# Patient Record
Sex: MALE | Race: OTHER | HISPANIC OR LATINO | Employment: FULL TIME | URBAN - METROPOLITAN AREA
[De-identification: names, ages, dates, MRNs, and addresses within clinical notes are randomized per-mention and may not be internally consistent; named-entity substitution may affect disease eponyms.]

---

## 2023-12-02 ENCOUNTER — ANESTHESIA EVENT (OUTPATIENT)
Dept: SURGERY | Facility: HOSPITAL | Age: 28
DRG: 399 | End: 2023-12-02

## 2023-12-02 ENCOUNTER — HOSPITAL ENCOUNTER (INPATIENT)
Facility: HOSPITAL | Age: 28
LOS: 2 days | Discharge: HOME | DRG: 399 | End: 2023-12-05
Attending: EMERGENCY MEDICINE | Admitting: SURGERY

## 2023-12-02 DIAGNOSIS — K35.30 ACUTE APPENDICITIS WITH LOCALIZED PERITONITIS, WITHOUT PERFORATION, ABSCESS, OR GANGRENE: ICD-10-CM

## 2023-12-02 DIAGNOSIS — K35.201 ACUTE APPENDICITIS WITH PERFORATION AND GENERALIZED PERITONITIS, WITHOUT ABSCESS, UNSPECIFIED WHETHER GANGRENE PRESENT: Primary | ICD-10-CM

## 2023-12-02 DIAGNOSIS — K35.33 ACUTE APPENDICITIS WITH APPENDICEAL ABSCESS: ICD-10-CM

## 2023-12-02 LAB
ALBUMIN SERPL BCP-MCNC: 4.9 G/DL (ref 3.5–5.2)
ALP SERPL-CCNC: 71 U/L (ref 55–135)
ALT SERPL W/O P-5'-P-CCNC: 107 U/L (ref 10–44)
ANION GAP SERPL CALC-SCNC: 15 MMOL/L (ref 8–16)
AST SERPL-CCNC: 35 U/L (ref 10–40)
BACTERIA #/AREA URNS HPF: NORMAL /HPF
BASOPHILS # BLD AUTO: 0.03 K/UL (ref 0–0.2)
BASOPHILS NFR BLD: 0.2 % (ref 0–1.9)
BILIRUB SERPL-MCNC: 0.8 MG/DL (ref 0.1–1)
BILIRUB UR QL STRIP: NEGATIVE
BUN SERPL-MCNC: 10 MG/DL (ref 6–20)
CALCIUM SERPL-MCNC: 9.8 MG/DL (ref 8.7–10.5)
CHLORIDE SERPL-SCNC: 103 MMOL/L (ref 95–110)
CLARITY UR: CLEAR
CO2 SERPL-SCNC: 20 MMOL/L (ref 23–29)
COLOR UR: YELLOW
CREAT SERPL-MCNC: 1.1 MG/DL (ref 0.5–1.4)
DIFFERENTIAL METHOD: ABNORMAL
EOSINOPHIL # BLD AUTO: 0 K/UL (ref 0–0.5)
EOSINOPHIL NFR BLD: 0 % (ref 0–8)
ERYTHROCYTE [DISTWIDTH] IN BLOOD BY AUTOMATED COUNT: 11.9 % (ref 11.5–14.5)
EST. GFR  (NO RACE VARIABLE): >60 ML/MIN/1.73 M^2
GLUCOSE SERPL-MCNC: 169 MG/DL (ref 70–110)
GLUCOSE UR QL STRIP: NEGATIVE
HCT VFR BLD AUTO: 46.3 % (ref 40–54)
HCV AB SERPL QL IA: NEGATIVE
HEP C VIRUS HOLD SPECIMEN: NORMAL
HGB BLD-MCNC: 16.2 G/DL (ref 14–18)
HGB UR QL STRIP: NEGATIVE
HIV 1+2 AB+HIV1 P24 AG SERPL QL IA: NEGATIVE
HYALINE CASTS #/AREA URNS LPF: 1 /LPF
IMM GRANULOCYTES # BLD AUTO: 0.06 K/UL (ref 0–0.04)
IMM GRANULOCYTES NFR BLD AUTO: 0.4 % (ref 0–0.5)
KETONES UR QL STRIP: ABNORMAL
LEUKOCYTE ESTERASE UR QL STRIP: NEGATIVE
LIPASE SERPL-CCNC: 39 U/L (ref 4–60)
LYMPHOCYTES # BLD AUTO: 0.5 K/UL (ref 1–4.8)
LYMPHOCYTES NFR BLD: 3.1 % (ref 18–48)
MCH RBC QN AUTO: 30.6 PG (ref 27–31)
MCHC RBC AUTO-ENTMCNC: 35 G/DL (ref 32–36)
MCV RBC AUTO: 88 FL (ref 82–98)
MICROSCOPIC COMMENT: NORMAL
MONOCYTES # BLD AUTO: 0.4 K/UL (ref 0.3–1)
MONOCYTES NFR BLD: 2.8 % (ref 4–15)
NEUTROPHILS # BLD AUTO: 14.7 K/UL (ref 1.8–7.7)
NEUTROPHILS NFR BLD: 93.5 % (ref 38–73)
NITRITE UR QL STRIP: NEGATIVE
NRBC BLD-RTO: 0 /100 WBC
PH UR STRIP: 7 [PH] (ref 5–8)
PLATELET # BLD AUTO: 199 K/UL (ref 150–450)
PMV BLD AUTO: 10.8 FL (ref 9.2–12.9)
POTASSIUM SERPL-SCNC: 4 MMOL/L (ref 3.5–5.1)
PROT SERPL-MCNC: 9.1 G/DL (ref 6–8.4)
PROT UR QL STRIP: ABNORMAL
RBC # BLD AUTO: 5.29 M/UL (ref 4.6–6.2)
RBC #/AREA URNS HPF: 0 /HPF (ref 0–4)
SODIUM SERPL-SCNC: 138 MMOL/L (ref 136–145)
SP GR UR STRIP: >1.03 (ref 1–1.03)
SQUAMOUS #/AREA URNS HPF: 1 /HPF
URN SPEC COLLECT METH UR: ABNORMAL
UROBILINOGEN UR STRIP-ACNC: NEGATIVE EU/DL
WBC # BLD AUTO: 15.69 K/UL (ref 3.9–12.7)
WBC #/AREA URNS HPF: 0 /HPF (ref 0–5)

## 2023-12-02 PROCEDURE — 25000003 PHARM REV CODE 250: Performed by: SURGERY

## 2023-12-02 PROCEDURE — 25000003 PHARM REV CODE 250: Performed by: REGISTERED NURSE

## 2023-12-02 PROCEDURE — G0378 HOSPITAL OBSERVATION PER HR: HCPCS

## 2023-12-02 PROCEDURE — 96361 HYDRATE IV INFUSION ADD-ON: CPT

## 2023-12-02 PROCEDURE — 86803 HEPATITIS C AB TEST: CPT | Performed by: EMERGENCY MEDICINE

## 2023-12-02 PROCEDURE — 81000 URINALYSIS NONAUTO W/SCOPE: CPT | Performed by: REGISTERED NURSE

## 2023-12-02 PROCEDURE — 63600175 PHARM REV CODE 636 W HCPCS: Performed by: SURGERY

## 2023-12-02 PROCEDURE — 96375 TX/PRO/DX INJ NEW DRUG ADDON: CPT

## 2023-12-02 PROCEDURE — 99223 PR INITIAL HOSPITAL CARE,LEVL III: ICD-10-PCS | Mod: ,,, | Performed by: SURGERY

## 2023-12-02 PROCEDURE — 83690 ASSAY OF LIPASE: CPT | Performed by: REGISTERED NURSE

## 2023-12-02 PROCEDURE — 80053 COMPREHEN METABOLIC PANEL: CPT | Performed by: REGISTERED NURSE

## 2023-12-02 PROCEDURE — 25500020 PHARM REV CODE 255: Performed by: EMERGENCY MEDICINE

## 2023-12-02 PROCEDURE — 85025 COMPLETE CBC W/AUTO DIFF WBC: CPT | Performed by: REGISTERED NURSE

## 2023-12-02 PROCEDURE — 87389 HIV-1 AG W/HIV-1&-2 AB AG IA: CPT | Performed by: EMERGENCY MEDICINE

## 2023-12-02 PROCEDURE — 99285 EMERGENCY DEPT VISIT HI MDM: CPT | Mod: 25

## 2023-12-02 PROCEDURE — 63600175 PHARM REV CODE 636 W HCPCS: Performed by: REGISTERED NURSE

## 2023-12-02 PROCEDURE — 99223 1ST HOSP IP/OBS HIGH 75: CPT | Mod: ,,, | Performed by: SURGERY

## 2023-12-02 PROCEDURE — 63600175 PHARM REV CODE 636 W HCPCS: Performed by: EMERGENCY MEDICINE

## 2023-12-02 PROCEDURE — 25000003 PHARM REV CODE 250: Performed by: EMERGENCY MEDICINE

## 2023-12-02 PROCEDURE — 96366 THER/PROPH/DIAG IV INF ADDON: CPT

## 2023-12-02 RX ORDER — HYDROCODONE BITARTRATE AND ACETAMINOPHEN 5; 325 MG/1; MG/1
1 TABLET ORAL EVERY 4 HOURS PRN
Status: DISCONTINUED | OUTPATIENT
Start: 2023-12-02 | End: 2023-12-05 | Stop reason: HOSPADM

## 2023-12-02 RX ORDER — HYDROMORPHONE HYDROCHLORIDE 2 MG/ML
1 INJECTION, SOLUTION INTRAMUSCULAR; INTRAVENOUS; SUBCUTANEOUS
Status: DISCONTINUED | OUTPATIENT
Start: 2023-12-02 | End: 2023-12-05 | Stop reason: HOSPADM

## 2023-12-02 RX ORDER — SODIUM CHLORIDE 9 MG/ML
1000 INJECTION, SOLUTION INTRAVENOUS CONTINUOUS
Status: DISCONTINUED | OUTPATIENT
Start: 2023-12-02 | End: 2023-12-02

## 2023-12-02 RX ORDER — MORPHINE SULFATE 4 MG/ML
4 INJECTION, SOLUTION INTRAMUSCULAR; INTRAVENOUS
Status: COMPLETED | OUTPATIENT
Start: 2023-12-02 | End: 2023-12-02

## 2023-12-02 RX ORDER — ONDANSETRON 4 MG/1
4 TABLET, ORALLY DISINTEGRATING ORAL EVERY 6 HOURS PRN
Status: DISCONTINUED | OUTPATIENT
Start: 2023-12-02 | End: 2023-12-05 | Stop reason: HOSPADM

## 2023-12-02 RX ORDER — ONDANSETRON 2 MG/ML
4 INJECTION INTRAMUSCULAR; INTRAVENOUS
Status: COMPLETED | OUTPATIENT
Start: 2023-12-02 | End: 2023-12-02

## 2023-12-02 RX ORDER — HYDROCODONE BITARTRATE AND ACETAMINOPHEN 7.5; 325 MG/1; MG/1
1 TABLET ORAL EVERY 6 HOURS PRN
Status: DISCONTINUED | OUTPATIENT
Start: 2023-12-02 | End: 2023-12-05 | Stop reason: HOSPADM

## 2023-12-02 RX ORDER — SODIUM CHLORIDE, SODIUM LACTATE, POTASSIUM CHLORIDE, CALCIUM CHLORIDE 600; 310; 30; 20 MG/100ML; MG/100ML; MG/100ML; MG/100ML
INJECTION, SOLUTION INTRAVENOUS CONTINUOUS
Status: DISCONTINUED | OUTPATIENT
Start: 2023-12-02 | End: 2023-12-03

## 2023-12-02 RX ADMIN — HYDROCODONE BITARTRATE AND ACETAMINOPHEN 1 TABLET: 7.5; 325 TABLET ORAL at 10:12

## 2023-12-02 RX ADMIN — ONDANSETRON 4 MG: 2 INJECTION INTRAMUSCULAR; INTRAVENOUS at 05:12

## 2023-12-02 RX ADMIN — SODIUM CHLORIDE 1000 ML: 9 INJECTION, SOLUTION INTRAVENOUS at 05:12

## 2023-12-02 RX ADMIN — SODIUM CHLORIDE, POTASSIUM CHLORIDE, SODIUM LACTATE AND CALCIUM CHLORIDE: 600; 310; 30; 20 INJECTION, SOLUTION INTRAVENOUS at 07:12

## 2023-12-02 RX ADMIN — SODIUM CHLORIDE 1000 ML: 9 INJECTION, SOLUTION INTRAVENOUS at 06:12

## 2023-12-02 RX ADMIN — PIPERACILLIN SODIUM AND TAZOBACTAM SODIUM 4.5 G: 4; .5 INJECTION, POWDER, FOR SOLUTION INTRAVENOUS at 06:12

## 2023-12-02 RX ADMIN — IOHEXOL 100 ML: 350 INJECTION, SOLUTION INTRAVENOUS at 05:12

## 2023-12-02 RX ADMIN — MORPHINE SULFATE 4 MG: 4 INJECTION INTRAVENOUS at 05:12

## 2023-12-02 NOTE — ED PROVIDER NOTES
Encounter Date: 12/2/2023       History     Chief Complaint   Patient presents with    Abdominal Pain     Abdominal pain started today at 3 am, + vomiting, no diarrhea, states all of abd hurts, also testicles hurt. No known fever.      Patient is a 28-year-old male who presents today with complaints of right lower quadrant abdominal pain that started 14 hours ago.  It woke him up from his sleep.  Three vomiting episodes.  One episode of diarrhea here in the emergency department.  Denies any fever.  Pain radiates towards his right testicle.  He does have pain is bilateral lower back.  No history of kidney stones.  No history of appendectomy.  Associated symptoms include dysuria.  Denies hematuria.  He took over-the-counter medications without relief.  He is requesting some pain medication      Review of patient's allergies indicates:  No Known Allergies  History reviewed. No pertinent past medical history.  History reviewed. No pertinent surgical history.  History reviewed. No pertinent family history.  Social History     Tobacco Use    Smoking status: Never    Smokeless tobacco: Never   Substance Use Topics    Alcohol use: Yes     Review of Systems   Gastrointestinal:  Positive for abdominal pain (RLQ), diarrhea, nausea and vomiting.   Genitourinary:  Positive for dysuria and testicular pain. Negative for scrotal swelling.   All other systems reviewed and are negative.      Physical Exam     Initial Vitals [12/02/23 1508]   BP Pulse Resp Temp SpO2   138/74 94 20 98.1 °F (36.7 °C) 99 %      MAP       --         Physical Exam    Nursing note and vitals reviewed.  Constitutional: He appears well-developed and well-nourished. No distress.   HENT:   Head: Normocephalic and atraumatic.   Mouth/Throat: Oropharynx is clear and moist.   Eyes: Conjunctivae and EOM are normal. Pupils are equal, round, and reactive to light.   Neck: Neck supple. No tracheal deviation present.   Cardiovascular:  Normal rate, regular rhythm,  normal heart sounds and intact distal pulses.           Pulmonary/Chest: Breath sounds normal. No respiratory distress.   Abdominal: Abdomen is soft. He exhibits no distension. There is no abdominal tenderness. There is no rebound and no guarding.   Genitourinary:    Genitourinary Comments: Mild right testicular tenderness to palpation without any edema.  No erythema.  No crepitus or skin change     Musculoskeletal:         General: No tenderness or edema. Normal range of motion.      Cervical back: Neck supple.     Neurological: He is alert and oriented to person, place, and time. GCS score is 15. GCS eye subscore is 4. GCS verbal subscore is 5. GCS motor subscore is 6.   No focal deficits   Skin: Skin is warm. No rash noted. No erythema.   Psychiatric: He has a normal mood and affect. His behavior is normal.         ED Course   Critical Care    Date/Time: 12/2/2023 7:02 PM    Performed by: Ryan Rodriguez MD  Authorized by: Ryan Rodriguez MD  Direct patient critical care time: 15 minutes  Additional history critical care time: 5 minutes  Ordering / reviewing critical care time: 5 minutes  Documentation critical care time: 5 minutes  Consulting other physicians critical care time: 5 minutes  Total critical care time (exclusive of procedural time) : 35 minutes  Critical care time was exclusive of separately billable procedures and treating other patients and teaching time.  Critical care was necessary to treat or prevent imminent or life-threatening deterioration of the following conditions: Acute appendicitis with perforation.  Critical care was time spent personally by me on the following activities: blood draw for specimens, development of treatment plan with patient or surrogate, discussions with consultants, interpretation of cardiac output measurements, evaluation of patient's response to treatment, examination of patient, obtaining history from patient or surrogate, ordering and performing treatments and  interventions, ordering and review of laboratory studies, ordering and review of radiographic studies, pulse oximetry, re-evaluation of patient's condition and review of old charts.        Labs Reviewed   CBC W/ AUTO DIFFERENTIAL - Abnormal; Notable for the following components:       Result Value    WBC 15.69 (*)     Gran # (ANC) 14.7 (*)     Immature Grans (Abs) 0.06 (*)     Lymph # 0.5 (*)     Gran % 93.5 (*)     Lymph % 3.1 (*)     Mono % 2.8 (*)     All other components within normal limits    Narrative:     Release to patient->Immediate   COMPREHENSIVE METABOLIC PANEL - Abnormal; Notable for the following components:    CO2 20 (*)     Glucose 169 (*)     Total Protein 9.1 (*)      (*)     All other components within normal limits    Narrative:     Release to patient->Immediate   URINALYSIS, REFLEX TO URINE CULTURE - Abnormal; Notable for the following components:    Specific Gravity, UA >1.030 (*)     Protein, UA 1+ (*)     Ketones, UA 1+ (*)     All other components within normal limits    Narrative:     Specimen Source->Urine   HIV 1 / 2 ANTIBODY    Narrative:     Release to patient->Immediate   HEPATITIS C ANTIBODY    Narrative:     Release to patient->Immediate   HEP C VIRUS HOLD SPECIMEN    Narrative:     Release to patient->Immediate   LIPASE    Narrative:     Release to patient->Immediate   URINALYSIS MICROSCOPIC    Narrative:     Specimen Source->Urine          Imaging Results               CT Abdomen Pelvis With IV Contrast NO Oral Contrast (Final result)  Result time 12/02/23 18:09:50      Final result by Rohan Johnson MD (12/02/23 18:09:50)                   Impression:      Acute appendicitis with findings concerning for early appendiceal perforation as above.  Surgical consultation is are gently advised.    This report was flagged in Epic as abnormal.    All CT scans at this facility are performed  using dose modulation techniques as appropriate to performed exam including the  following:  automated exposure control; adjustment of mA and/or kV according to the patients size (this includes techniques or standardized protocols for targeted exams where dose is matched to indication/reason for exam: i.e. extremities or head);  iterative reconstruction technique.      Electronically signed by: Rohan Johnson  Date:    12/02/2023  Time:    18:09               Narrative:    EXAMINATION:  CT ABDOMEN PELVIS WITH IV CONTRAST    CLINICAL HISTORY:  Abdominal pain, acute, nonlocalized;    TECHNIQUE:  Low dose axial images, sagittal and coronal reformations were obtained from the lung bases to the pubic symphysis.  Contrast was not administered.    COMPARISON:  None    FINDINGS:  Heart: Normal in size. No pericardial effusion.    Lung Bases: Well aerated, without consolidation or pleural fluid.    Liver: Diffuse hepatic steatosis.  Mild hepatomegaly.    Gallbladder: No calcified gallstones.    Bile Ducts: No evidence of dilated ducts.    Pancreas: No mass or peripancreatic fat stranding.    Spleen: Unremarkable.    Adrenals: Unremarkable.    Kidneys/ Ureters: No hydronephrosis.  No obstructive uropathy.  No nonobstructive nephrolithiasis.    Bladder: No evidence of wall thickening.    Reproductive organs: Unremarkable.    GI Tract/Mesentery: Appendicitis with findings concerning for early perforation given non enhancement of a portion of the appendiceal tip such as on series 602, image 100.  Urgent surgical consultation is advised.    Peritoneal Space: Small volume right lower quadrant pelvic free fluid.  No free air.    Retroperitoneum: No significant adenopathy.    Abdominal wall: Unremarkable.    Vasculature: No significant atherosclerosis or aneurysm.    Bones: No acute fracture.                                       Medications   piperacillin-tazobactam (ZOSYN) 4.5 g in dextrose 5 % in water (D5W) 100 mL IVPB (MB+) (4.5 g Intravenous New Bag 12/2/23 0762)   HYDROmorphone (PF) injection 1 mg (has no  administration in time range)   HYDROcodone-acetaminophen 7.5-325 mg per tablet 1 tablet (has no administration in time range)   HYDROcodone-acetaminophen 5-325 mg per tablet 1 tablet (has no administration in time range)   ondansetron disintegrating tablet 4 mg (has no administration in time range)   lactated ringers infusion (has no administration in time range)   ondansetron injection 4 mg (4 mg Intravenous Given 12/2/23 1711)   sodium chloride 0.9% bolus 1,000 mL 1,000 mL (0 mLs Intravenous Stopped 12/2/23 1806)   morphine injection 4 mg (4 mg Intravenous Given 12/2/23 1712)   iohexoL (OMNIPAQUE 350) injection 100 mL (100 mLs Intravenous Given 12/2/23 1738)     Medical Decision Making  Patient reporting right lower quadrant pain that radiated to the bilateral lower back and right testicle.  Tender on palpation.  Differential diagnosis includes appendicitis, nephrolithiasis, testicular torsion, urinary tract infection.  Workup revealed appendicitis with acute perforation.  Patient was treated with pain medicine, nausea medicine, IV fluids, Zosyn.  General surgery consulted and evaluated patient at bedside.  Patient admitted to general surgery Dr. Cornejo    Amount and/or Complexity of Data Reviewed  External Data Reviewed: notes.  Labs: ordered. Decision-making details documented in ED Course.  Radiology: ordered. Decision-making details documented in ED Course.    Risk  Prescription drug management.  Parenteral controlled substances.  Decision regarding hospitalization.  Emergency major surgery.                                      Clinical Impression:  Final diagnoses:  [K35.201] Acute appendicitis with perforation and generalized peritonitis, without abscess, unspecified whether gangrene present (Primary)          ED Disposition Condition    Observation                 Ryan Rodriguez MD  12/02/23 1008

## 2023-12-02 NOTE — FIRST PROVIDER EVALUATION
Medical screening examination initiated.  I have conducted a focused provider triage encounter, findings are as follows:    Brief history of present illness:  Abdominal pain since 3:00 a.m., vomiting x3    Vitals:    12/02/23 1508   BP: 138/74   BP Location: Right arm   Patient Position: Sitting   Pulse: 94   Resp: 20   Temp: 98.1 °F (36.7 °C)   TempSrc: Oral   SpO2: 99%   Weight: 114.8 kg (252 lb 15.7 oz)       Pertinent physical exam:  Abdominal tenderness with guarding, vital signs stable    Brief workup plan:  Workup    Preliminary workup initiated; this workup will be continued and followed by the physician or advanced practice provider that is assigned to the patient when roomed.

## 2023-12-03 ENCOUNTER — ANESTHESIA (OUTPATIENT)
Dept: SURGERY | Facility: HOSPITAL | Age: 28
DRG: 399 | End: 2023-12-03

## 2023-12-03 LAB
ANION GAP SERPL CALC-SCNC: 11 MMOL/L (ref 8–16)
BASOPHILS # BLD AUTO: 0.02 K/UL (ref 0–0.2)
BASOPHILS NFR BLD: 0.1 % (ref 0–1.9)
BUN SERPL-MCNC: 12 MG/DL (ref 6–20)
CALCIUM SERPL-MCNC: 9.1 MG/DL (ref 8.7–10.5)
CHLORIDE SERPL-SCNC: 104 MMOL/L (ref 95–110)
CO2 SERPL-SCNC: 23 MMOL/L (ref 23–29)
CREAT SERPL-MCNC: 1.1 MG/DL (ref 0.5–1.4)
DIFFERENTIAL METHOD: ABNORMAL
EOSINOPHIL # BLD AUTO: 0 K/UL (ref 0–0.5)
EOSINOPHIL NFR BLD: 0 % (ref 0–8)
ERYTHROCYTE [DISTWIDTH] IN BLOOD BY AUTOMATED COUNT: 12 % (ref 11.5–14.5)
EST. GFR  (NO RACE VARIABLE): >60 ML/MIN/1.73 M^2
GLUCOSE SERPL-MCNC: 137 MG/DL (ref 70–110)
HCT VFR BLD AUTO: 42.9 % (ref 40–54)
HGB BLD-MCNC: 14.5 G/DL (ref 14–18)
IMM GRANULOCYTES # BLD AUTO: 0.05 K/UL (ref 0–0.04)
IMM GRANULOCYTES NFR BLD AUTO: 0.3 % (ref 0–0.5)
LYMPHOCYTES # BLD AUTO: 1 K/UL (ref 1–4.8)
LYMPHOCYTES NFR BLD: 6.2 % (ref 18–48)
MCH RBC QN AUTO: 30 PG (ref 27–31)
MCHC RBC AUTO-ENTMCNC: 33.8 G/DL (ref 32–36)
MCV RBC AUTO: 89 FL (ref 82–98)
MONOCYTES # BLD AUTO: 0.4 K/UL (ref 0.3–1)
MONOCYTES NFR BLD: 2.8 % (ref 4–15)
NEUTROPHILS # BLD AUTO: 14.2 K/UL (ref 1.8–7.7)
NEUTROPHILS NFR BLD: 90.6 % (ref 38–73)
NRBC BLD-RTO: 0 /100 WBC
PLATELET # BLD AUTO: 187 K/UL (ref 150–450)
PMV BLD AUTO: 11.1 FL (ref 9.2–12.9)
POCT GLUCOSE: 165 MG/DL (ref 70–110)
POTASSIUM SERPL-SCNC: 4.1 MMOL/L (ref 3.5–5.1)
RBC # BLD AUTO: 4.83 M/UL (ref 4.6–6.2)
SODIUM SERPL-SCNC: 138 MMOL/L (ref 136–145)
WBC # BLD AUTO: 15.62 K/UL (ref 3.9–12.7)

## 2023-12-03 PROCEDURE — 63600175 PHARM REV CODE 636 W HCPCS: Performed by: SURGERY

## 2023-12-03 PROCEDURE — 44970 PR LAP,APPENDECTOMY: ICD-10-PCS | Mod: ,,, | Performed by: SURGERY

## 2023-12-03 PROCEDURE — 36415 COLL VENOUS BLD VENIPUNCTURE: CPT | Performed by: SURGERY

## 2023-12-03 PROCEDURE — 96361 HYDRATE IV INFUSION ADD-ON: CPT

## 2023-12-03 PROCEDURE — 88304 TISSUE EXAM BY PATHOLOGIST: CPT | Performed by: PATHOLOGY

## 2023-12-03 PROCEDURE — 63600175 PHARM REV CODE 636 W HCPCS: Performed by: NURSE ANESTHETIST, CERTIFIED REGISTERED

## 2023-12-03 PROCEDURE — 96375 TX/PRO/DX INJ NEW DRUG ADDON: CPT

## 2023-12-03 PROCEDURE — 37000008 HC ANESTHESIA 1ST 15 MINUTES: Performed by: SURGERY

## 2023-12-03 PROCEDURE — C1729 CATH, DRAINAGE: HCPCS | Performed by: SURGERY

## 2023-12-03 PROCEDURE — 80048 BASIC METABOLIC PNL TOTAL CA: CPT | Performed by: SURGERY

## 2023-12-03 PROCEDURE — 63600175 PHARM REV CODE 636 W HCPCS: Performed by: ANESTHESIOLOGY

## 2023-12-03 PROCEDURE — 71000039 HC RECOVERY, EACH ADD'L HOUR: Performed by: SURGERY

## 2023-12-03 PROCEDURE — 36000709 HC OR TIME LEV III EA ADD 15 MIN: Performed by: SURGERY

## 2023-12-03 PROCEDURE — 27201423 OPTIME MED/SURG SUP & DEVICES STERILE SUPPLY: Performed by: SURGERY

## 2023-12-03 PROCEDURE — 88304 TISSUE EXAM BY PATHOLOGIST: CPT | Mod: 26,,, | Performed by: PATHOLOGY

## 2023-12-03 PROCEDURE — 44970 LAPAROSCOPY APPENDECTOMY: CPT | Mod: ,,, | Performed by: SURGERY

## 2023-12-03 PROCEDURE — 37000009 HC ANESTHESIA EA ADD 15 MINS: Performed by: SURGERY

## 2023-12-03 PROCEDURE — 85025 COMPLETE CBC W/AUTO DIFF WBC: CPT | Performed by: SURGERY

## 2023-12-03 PROCEDURE — 36000708 HC OR TIME LEV III 1ST 15 MIN: Performed by: SURGERY

## 2023-12-03 PROCEDURE — 88304 PR  SURG PATH,LEVEL III: ICD-10-PCS | Mod: 26,,, | Performed by: PATHOLOGY

## 2023-12-03 PROCEDURE — 96376 TX/PRO/DX INJ SAME DRUG ADON: CPT

## 2023-12-03 PROCEDURE — 99233 PR SUBSEQUENT HOSPITAL CARE,LEVL III: ICD-10-PCS | Mod: 57,,, | Performed by: SURGERY

## 2023-12-03 PROCEDURE — 25000003 PHARM REV CODE 250: Performed by: SURGERY

## 2023-12-03 PROCEDURE — 71000033 HC RECOVERY, INTIAL HOUR: Performed by: SURGERY

## 2023-12-03 PROCEDURE — 25000003 PHARM REV CODE 250: Performed by: NURSE ANESTHETIST, CERTIFIED REGISTERED

## 2023-12-03 PROCEDURE — 99233 SBSQ HOSP IP/OBS HIGH 50: CPT | Mod: 57,,, | Performed by: SURGERY

## 2023-12-03 PROCEDURE — 11000001 HC ACUTE MED/SURG PRIVATE ROOM

## 2023-12-03 RX ORDER — ROCURONIUM BROMIDE 10 MG/ML
INJECTION, SOLUTION INTRAVENOUS
Status: DISCONTINUED | OUTPATIENT
Start: 2023-12-03 | End: 2023-12-03

## 2023-12-03 RX ORDER — CHLORHEXIDINE GLUCONATE ORAL RINSE 1.2 MG/ML
10 SOLUTION DENTAL 2 TIMES DAILY
Status: DISCONTINUED | OUTPATIENT
Start: 2023-12-03 | End: 2023-12-05 | Stop reason: HOSPADM

## 2023-12-03 RX ORDER — ONDANSETRON 2 MG/ML
INJECTION INTRAMUSCULAR; INTRAVENOUS
Status: DISCONTINUED | OUTPATIENT
Start: 2023-12-03 | End: 2023-12-03

## 2023-12-03 RX ORDER — SODIUM CHLORIDE, SODIUM LACTATE, POTASSIUM CHLORIDE, CALCIUM CHLORIDE 600; 310; 30; 20 MG/100ML; MG/100ML; MG/100ML; MG/100ML
INJECTION, SOLUTION INTRAVENOUS CONTINUOUS PRN
Status: DISCONTINUED | OUTPATIENT
Start: 2023-12-03 | End: 2023-12-03

## 2023-12-03 RX ORDER — SODIUM CHLORIDE 0.9 % (FLUSH) 0.9 %
10 SYRINGE (ML) INJECTION
Status: DISCONTINUED | OUTPATIENT
Start: 2023-12-03 | End: 2023-12-05 | Stop reason: HOSPADM

## 2023-12-03 RX ORDER — CEFAZOLIN SODIUM 1 G/3ML
INJECTION, POWDER, FOR SOLUTION INTRAMUSCULAR; INTRAVENOUS
Status: DISCONTINUED | OUTPATIENT
Start: 2023-12-03 | End: 2023-12-03

## 2023-12-03 RX ORDER — SODIUM CHLORIDE, SODIUM LACTATE, POTASSIUM CHLORIDE, CALCIUM CHLORIDE 600; 310; 30; 20 MG/100ML; MG/100ML; MG/100ML; MG/100ML
INJECTION, SOLUTION INTRAVENOUS CONTINUOUS
Status: DISCONTINUED | OUTPATIENT
Start: 2023-12-03 | End: 2023-12-05 | Stop reason: HOSPADM

## 2023-12-03 RX ORDER — HYDROMORPHONE HYDROCHLORIDE 2 MG/ML
0.2 INJECTION, SOLUTION INTRAMUSCULAR; INTRAVENOUS; SUBCUTANEOUS EVERY 5 MIN PRN
Status: COMPLETED | OUTPATIENT
Start: 2023-12-03 | End: 2023-12-03

## 2023-12-03 RX ORDER — TALC
6 POWDER (GRAM) TOPICAL NIGHTLY PRN
Status: DISCONTINUED | OUTPATIENT
Start: 2023-12-03 | End: 2023-12-05 | Stop reason: HOSPADM

## 2023-12-03 RX ORDER — BUPIVACAINE HYDROCHLORIDE 2.5 MG/ML
INJECTION, SOLUTION EPIDURAL; INFILTRATION; INTRACAUDAL
Status: DISCONTINUED | OUTPATIENT
Start: 2023-12-03 | End: 2023-12-03 | Stop reason: HOSPADM

## 2023-12-03 RX ORDER — PROPOFOL 10 MG/ML
VIAL (ML) INTRAVENOUS
Status: DISCONTINUED | OUTPATIENT
Start: 2023-12-03 | End: 2023-12-03

## 2023-12-03 RX ORDER — ONDANSETRON 2 MG/ML
4 INJECTION INTRAMUSCULAR; INTRAVENOUS DAILY PRN
Status: DISCONTINUED | OUTPATIENT
Start: 2023-12-03 | End: 2023-12-03

## 2023-12-03 RX ORDER — NEOSTIGMINE METHYLSULFATE 1 MG/ML
INJECTION, SOLUTION INTRAVENOUS
Status: DISCONTINUED | OUTPATIENT
Start: 2023-12-03 | End: 2023-12-03

## 2023-12-03 RX ORDER — ACETAMINOPHEN 325 MG/1
650 TABLET ORAL EVERY 8 HOURS PRN
Status: DISCONTINUED | OUTPATIENT
Start: 2023-12-03 | End: 2023-12-05 | Stop reason: HOSPADM

## 2023-12-03 RX ORDER — KETOROLAC TROMETHAMINE 30 MG/ML
15 INJECTION, SOLUTION INTRAMUSCULAR; INTRAVENOUS EVERY 8 HOURS PRN
Status: DISCONTINUED | OUTPATIENT
Start: 2023-12-03 | End: 2023-12-03

## 2023-12-03 RX ORDER — LIDOCAINE HYDROCHLORIDE 10 MG/ML
INJECTION, SOLUTION EPIDURAL; INFILTRATION; INTRACAUDAL; PERINEURAL
Status: DISCONTINUED | OUTPATIENT
Start: 2023-12-03 | End: 2023-12-03

## 2023-12-03 RX ORDER — OXYCODONE AND ACETAMINOPHEN 5; 325 MG/1; MG/1
1 TABLET ORAL
Status: DISCONTINUED | OUTPATIENT
Start: 2023-12-03 | End: 2023-12-03

## 2023-12-03 RX ORDER — MIDAZOLAM HYDROCHLORIDE 1 MG/ML
INJECTION INTRAMUSCULAR; INTRAVENOUS
Status: DISCONTINUED | OUTPATIENT
Start: 2023-12-03 | End: 2023-12-03

## 2023-12-03 RX ORDER — ENOXAPARIN SODIUM 100 MG/ML
40 INJECTION SUBCUTANEOUS EVERY 24 HOURS
Status: DISCONTINUED | OUTPATIENT
Start: 2023-12-04 | End: 2023-12-05 | Stop reason: HOSPADM

## 2023-12-03 RX ORDER — SUCCINYLCHOLINE CHLORIDE 20 MG/ML
INJECTION INTRAMUSCULAR; INTRAVENOUS
Status: DISCONTINUED | OUTPATIENT
Start: 2023-12-03 | End: 2023-12-03

## 2023-12-03 RX ORDER — FENTANYL CITRATE 50 UG/ML
INJECTION, SOLUTION INTRAMUSCULAR; INTRAVENOUS
Status: DISCONTINUED | OUTPATIENT
Start: 2023-12-03 | End: 2023-12-03

## 2023-12-03 RX ORDER — LIDOCAINE HYDROCHLORIDE 10 MG/ML
1 INJECTION, SOLUTION EPIDURAL; INFILTRATION; INTRACAUDAL; PERINEURAL ONCE AS NEEDED
Status: DISCONTINUED | OUTPATIENT
Start: 2023-12-03 | End: 2023-12-03

## 2023-12-03 RX ORDER — METOCLOPRAMIDE 10 MG/1
10 TABLET ORAL EVERY 6 HOURS PRN
Status: DISCONTINUED | OUTPATIENT
Start: 2023-12-03 | End: 2023-12-05 | Stop reason: HOSPADM

## 2023-12-03 RX ADMIN — FENTANYL CITRATE 100 MCG: 50 INJECTION, SOLUTION INTRAMUSCULAR; INTRAVENOUS at 10:12

## 2023-12-03 RX ADMIN — SODIUM CHLORIDE, POTASSIUM CHLORIDE, SODIUM LACTATE AND CALCIUM CHLORIDE: 600; 310; 30; 20 INJECTION, SOLUTION INTRAVENOUS at 03:12

## 2023-12-03 RX ADMIN — HYDROMORPHONE HYDROCHLORIDE 0.2 MG: 2 INJECTION INTRAMUSCULAR; INTRAVENOUS; SUBCUTANEOUS at 12:12

## 2023-12-03 RX ADMIN — HYDROMORPHONE HYDROCHLORIDE 1 MG: 2 INJECTION INTRAMUSCULAR; INTRAVENOUS; SUBCUTANEOUS at 07:12

## 2023-12-03 RX ADMIN — KETOROLAC TROMETHAMINE 15 MG: 30 INJECTION, SOLUTION INTRAMUSCULAR; INTRAVENOUS at 12:12

## 2023-12-03 RX ADMIN — HYDROMORPHONE HYDROCHLORIDE 0.2 MG: 2 INJECTION INTRAMUSCULAR; INTRAVENOUS; SUBCUTANEOUS at 01:12

## 2023-12-03 RX ADMIN — HYDROMORPHONE HYDROCHLORIDE 1 MG: 2 INJECTION INTRAMUSCULAR; INTRAVENOUS; SUBCUTANEOUS at 12:12

## 2023-12-03 RX ADMIN — ROCURONIUM BROMIDE 10 MG: 10 SOLUTION INTRAVENOUS at 10:12

## 2023-12-03 RX ADMIN — ONDANSETRON 4 MG: 2 INJECTION INTRAMUSCULAR; INTRAVENOUS at 11:12

## 2023-12-03 RX ADMIN — CEFAZOLIN 2 G: 330 INJECTION, POWDER, FOR SOLUTION INTRAMUSCULAR; INTRAVENOUS at 10:12

## 2023-12-03 RX ADMIN — SODIUM CHLORIDE, POTASSIUM CHLORIDE, SODIUM LACTATE AND CALCIUM CHLORIDE: 600; 310; 30; 20 INJECTION, SOLUTION INTRAVENOUS at 04:12

## 2023-12-03 RX ADMIN — SUCCINYLCHOLINE CHLORIDE 160 MG: 20 INJECTION, SOLUTION INTRAMUSCULAR; INTRAVENOUS; PARENTERAL at 09:12

## 2023-12-03 RX ADMIN — GLYCOPYRROLATE 0.6 MG: 0.2 INJECTION, SOLUTION INTRAMUSCULAR; INTRAVENOUS at 11:12

## 2023-12-03 RX ADMIN — HYDROCODONE BITARTRATE AND ACETAMINOPHEN 1 TABLET: 7.5; 325 TABLET ORAL at 04:12

## 2023-12-03 RX ADMIN — PIPERACILLIN SODIUM AND TAZOBACTAM SODIUM 4.5 G: 4; .5 INJECTION, POWDER, FOR SOLUTION INTRAVENOUS at 03:12

## 2023-12-03 RX ADMIN — FENTANYL CITRATE 100 MCG: 50 INJECTION, SOLUTION INTRAMUSCULAR; INTRAVENOUS at 09:12

## 2023-12-03 RX ADMIN — ROCURONIUM BROMIDE 20 MG: 10 SOLUTION INTRAVENOUS at 10:12

## 2023-12-03 RX ADMIN — ROCURONIUM BROMIDE 35 MG: 10 SOLUTION INTRAVENOUS at 10:12

## 2023-12-03 RX ADMIN — HYDROMORPHONE HYDROCHLORIDE 1 MG: 2 INJECTION INTRAMUSCULAR; INTRAVENOUS; SUBCUTANEOUS at 10:12

## 2023-12-03 RX ADMIN — LIDOCAINE HYDROCHLORIDE 50 MG: 10 SOLUTION INTRAVENOUS at 09:12

## 2023-12-03 RX ADMIN — SODIUM CHLORIDE, SODIUM LACTATE, POTASSIUM CHLORIDE, AND CALCIUM CHLORIDE: 600; 310; 30; 20 INJECTION, SOLUTION INTRAVENOUS at 09:12

## 2023-12-03 RX ADMIN — NEOSTIGMINE METHYLSULFATE 4 MG: 1 INJECTION INTRAVENOUS at 11:12

## 2023-12-03 RX ADMIN — PIPERACILLIN SODIUM AND TAZOBACTAM SODIUM 4.5 G: 4; .5 INJECTION, POWDER, FOR SOLUTION INTRAVENOUS at 10:12

## 2023-12-03 RX ADMIN — ROCURONIUM BROMIDE 5 MG: 10 SOLUTION INTRAVENOUS at 09:12

## 2023-12-03 RX ADMIN — MIDAZOLAM HYDROCHLORIDE 2 MG: 1 INJECTION, SOLUTION INTRAMUSCULAR; INTRAVENOUS at 09:12

## 2023-12-03 RX ADMIN — HYDROCODONE BITARTRATE AND ACETAMINOPHEN 1 TABLET: 5; 325 TABLET ORAL at 07:12

## 2023-12-03 RX ADMIN — PROPOFOL 160 MG: 10 INJECTION, EMULSION INTRAVENOUS at 09:12

## 2023-12-03 NOTE — AI DETERIORATION ALERT
Artificial Intelligence Notification  [facility]  [address] [place] [address]  Phone: [phone]    This documentation was triggered by an Artificial Intelligence Notification:    Admit Date: 2023   LOS: 0  Code Status: Full Code  : 1995  Age: 28 y.o.  Weight:   Wt Readings from Last 1 Encounters:   23 114.8 kg (252 lb 15.7 oz)        Sex: male  Bed: A550/A550 A  MRN: [id number]  Attending Physician: Zechariah Cornejo MD     Date of Alert: 2023  Time AI Alert Received:             Vitals:    23 1803   BP: 135/72   Pulse: 110   Resp:    Temp:      SpO2: 95 %      Artificial Intelligence alert discussed with Provider:     Name: Dr. Coronado   Date/Time of Provider Notification:       Patient Condition: Stable   Pt seen and examined. AAOx3. HR mildly elevated. Pt reports pain to RLQ radiating across lower abdomen rated 8/10. Pain is the same as it was on arrival. Last IV pain med was around 1700.  Encouraged to ask nurse for pain meds since prn. Pt is stable and in no acute distress.

## 2023-12-03 NOTE — ANESTHESIA PREPROCEDURE EVALUATION
12/03/2023  Magen Thapa is a 28 y.o., male.    Patient Active Problem List   Diagnosis    Acute appendicitis with localized peritonitis, without perforation, abscess, or gangrene     History reviewed. No pertinent surgical history.    Pre-op Assessment    I have reviewed the Patient Summary Reports.    I have reviewed the NPO Status.   I have reviewed the Medications.     Review of Systems  Anesthesia Hx:  No problems with previous Anesthesia                Social:  Non-Smoker, Social Alcohol Use       Hematology/Oncology:  Hematology Normal                                     Cardiovascular:  Cardiovascular Normal                                            Pulmonary:  Pulmonary Normal                       Renal/:  Renal/ Normal                 Hepatic/GI:  Hepatic/GI Normal       Acute appendicitis               Neurological:  Neurology Normal                                      Endocrine:  Endocrine Normal                Physical Exam  General: Well nourished    Airway:  Mallampati: I   Mouth Opening: Normal  TM Distance: Normal  Neck ROM: Normal ROM    Dental:  Intact        Anesthesia Plan  Type of Anesthesia, risks & benefits discussed:    Anesthesia Type: Gen ETT  Intra-op Monitoring Plan: Standard ASA Monitors  Post Op Pain Control Plan: multimodal analgesia  Induction:  IV and rapid sequence  Airway Plan: , Post-Induction  Informed Consent: Informed consent signed with the Patient and all parties understand the risks and agree with anesthesia plan.  All questions answered.   ASA Score: 2    Ready For Surgery From Anesthesia Perspective.     .      Chemistry        Component Value Date/Time     12/02/2023 1627    K 4.0 12/02/2023 1627     12/02/2023 1627    CO2 20 (L) 12/02/2023 1627    BUN 10 12/02/2023 1627    CREATININE 1.1 12/02/2023 1627     (H) 12/02/2023 1627         Component Value Date/Time    CALCIUM 9.8 12/02/2023 1627    ALKPHOS 71 12/02/2023 1627    AST 35 12/02/2023 1627     (H) 12/02/2023 1627    BILITOT 0.8 12/02/2023 1627        Lab Results   Component Value Date    WBC 15.62 (H) 12/03/2023    HGB 14.5 12/03/2023    HCT 42.9 12/03/2023    MCV 89 12/03/2023     12/03/2023

## 2023-12-03 NOTE — SUBJECTIVE & OBJECTIVE
Interval History:  Slightly less pain white blood cell count.  For appendectomy this morning    Medications:  Continuous Infusions:   lactated ringers 100 mL/hr at 12/03/23 0435     Scheduled Meds:  PRN Meds:acetaminophen, HYDROcodone-acetaminophen, HYDROcodone-acetaminophen, HYDROmorphone, LIDOcaine (PF) 10 mg/ml (1%), melatonin, metoclopramide HCl, ondansetron, sodium chloride 0.9%     Review of patient's allergies indicates:  No Known Allergies  Objective:     Vital Signs (Most Recent):  Temp: 98.8 °F (37.1 °C) (12/03/23 0737)  Pulse: (!) 118 (12/03/23 0737)  Resp: 18 (12/03/23 0737)  BP: 106/60 (12/03/23 0737)  SpO2: (!) 92 % (12/03/23 0737) Vital Signs (24h Range):  Temp:  [98.1 °F (36.7 °C)-99.1 °F (37.3 °C)] 98.8 °F (37.1 °C)  Pulse:  [] 118  Resp:  [16-20] 18  SpO2:  [92 %-99 %] 92 %  BP: (106-164)/(58-74) 106/60     Weight: 114.8 kg (252 lb 15.7 oz)  Body mass index is 33.38 kg/m².    Intake/Output - Last 3 Shifts         12/01 0700  12/02 0659 12/02 0700  12/03 0659 12/03 0700  12/04 0659    P.O.  0     I.V. (mL/kg)  89.6 (0.8)     IV Piggyback  1100     Total Intake(mL/kg)  1189.6 (10.4)     Urine (mL/kg/hr)  2     Total Output  2     Net  +1187.6            Stool Occurrence  1 x              Physical Exam  Vitals and nursing note reviewed.   Constitutional:       General: He is not in acute distress.     Appearance: He is well-developed.   HENT:      Head: Normocephalic and atraumatic.   Eyes:      General: No scleral icterus.     Pupils: Pupils are equal, round, and reactive to light.   Neck:      Thyroid: No thyromegaly.      Vascular: No JVD.      Trachea: No tracheal deviation.   Cardiovascular:      Rate and Rhythm: Normal rate and regular rhythm.      Heart sounds: Normal heart sounds.   Pulmonary:      Effort: Pulmonary effort is normal.      Breath sounds: Normal breath sounds.   Abdominal:      General: Bowel sounds are normal. There is no distension.      Palpations: Abdomen is soft.  There is no mass.      Tenderness: There is no abdominal tenderness (right lower quadrant at McBurney's). There is no guarding or rebound.   Musculoskeletal:         General: Normal range of motion.      Cervical back: Normal range of motion and neck supple.   Lymphadenopathy:      Cervical: No cervical adenopathy.   Skin:     General: Skin is warm and dry.   Neurological:      Mental Status: He is alert and oriented to person, place, and time.   Psychiatric:         Mood and Affect: Mood normal.         Behavior: Behavior normal.         Thought Content: Thought content normal.         Judgment: Judgment normal.          Significant Labs:  I have reviewed all pertinent lab results within the past 24 hours.  CBC:   Recent Labs   Lab 12/03/23  0643   WBC 15.62*   RBC 4.83   HGB 14.5   HCT 42.9      MCV 89   MCH 30.0   MCHC 33.8     BMP:   Recent Labs   Lab 12/03/23  0643   *      K 4.1      CO2 23   BUN 12   CREATININE 1.1   CALCIUM 9.1       Significant Diagnostics:  I have reviewed all pertinent imaging results/findings within the past 24 hours.  No new

## 2023-12-03 NOTE — ASSESSMENT & PLAN NOTE
For laparoscopic appendectomy today.      Need for surgery risks benefits and complications were discussed.  I discussed the failure rate of medical management    The need for surgery, risks, benefits, complictions and alternative were discussed.  These included infection, bleeding, bowel or vascular injury and abscess. The need for open conversion was discussed. The risks of blood products was discussed.  Questions answered.  Consent obtained       A  was used

## 2023-12-03 NOTE — ANESTHESIA POSTPROCEDURE EVALUATION
Anesthesia Post Evaluation    Patient: Magen Thapa    Procedure(s) Performed: Procedure(s) (LRB):  APPENDECTOMY, LAPAROSCOPIC (N/A)    Final Anesthesia Type: general      Patient location during evaluation: PACU  Patient participation: Yes- Able to Participate  Level of consciousness: awake and alert  Post-procedure vital signs: reviewed and stable  Pain management: adequate  Airway patency: patent  KADEN mitigation strategies: Verification of full reversal of neuromuscular block  PONV status at discharge: No PONV  Anesthetic complications: no      Cardiovascular status: hemodynamically stable  Respiratory status: spontaneous ventilation  Hydration status: euvolemic  Follow-up not needed.              Vitals Value Taken Time   /56 12/03/23 1322   Temp 36.8 °C (98.2 °F) 12/03/23 1322   Pulse 91 12/03/23 1322   Resp 15 12/03/23 1600   SpO2 98 % 12/03/23 1322         Event Time   Out of Recovery 12/03/2023 13:10:00         Pain/Zora Score: Pain Rating Prior to Med Admin: 6 (12/3/2023  4:00 PM)  Pain Rating Post Med Admin: 4 (12/3/2023  1:12 AM)  Zora Score: 8 (12/3/2023 12:30 PM)

## 2023-12-03 NOTE — OP NOTE
[facility]  Surgery Department  Operative Note    SUMMARY     Date of Procedure: 12/3/2023     Procedure: Procedure(s) (LRB):  APPENDECTOMY, LAPAROSCOPIC (N/A)     Surgeon(s) and Role:     * Zechariah Cornejo MD - Primary    Assisting Surgeon: None    Pre-Operative Diagnosis: Acute appendicitis [K35.80]    Post-Operative Diagnosis: Post-Op Diagnosis Codes:     * Acute appendicitis [K35.80]    Anesthesia: General    Operative Findings (including complications, if any):     Appendicitis with abscess.  No chio evidence of a perforated appendix    Description of Technical Procedures:     The patient was placed on the operating table in the supine position. SCDs were placed for DVT prophylaxis and antibiotics were administered. General anesthesia was induced. A Abarca catheter was placed. The abdomen was prepped and draped in a sterile fashion.    An incision was made at the umbilicus. The fascia was elevated and the Veress needle was inserted. Proper position was confirmed by aspiration and saline meniscus test. The abdomen was insufflated with carbon dioxide to a pressure of 15 mmHg. The patient tolerated insufflation well. A 5-mm trocar was then inserted.     The laparoscope was inserted and the abdomen inspected. No injuries from the initial trocar placement were noted. Under direct visualization, 5-mm trocars were inserted above the symphysis pubis and a 12 mm in the left lower quadrant lateral to the rectus muscle. Care was taken to avoid injury to the bladder or inferior epigastric vessels. The table was placed in the Trendelenburg position with the right side elevated.     There was noted to be a purulent exudate of the omentum and small bowel in the right lower quadrant.  There was murky fluid in the pelvis.      The small bowel was swept away from the lateral sidewall and the appendix was identified.  The purulent material was suctioned.  The fluid was suctioned from the appendix    The cecum was gently  grasped with an atraumatic grasper and pulled toward the left upper quadrant. The lateral peritoneal attachment was incised. The appendix was then identified, grasped, and elevated. It was noted to be inflamed. A window was created between the base of the appendix and the mesoappendix.     An endoscopic linear cutting stapler was then used to divide and staple the base of the appendix. It was reloaded with a vascular cartridge and the mesoappendix similarly divided.     The pelvis was re-irrigated and fluid was removed.      The staple line and the appendiceal stump was then irrigated and hemostasis was assured. Fluid was suctioned.      The appendix was placed in a Endo-Catch bag and removed through the 12 mm trocar in pieces.      The trocar was reinserted    A 15 mm Mark Anthony drain was placed through the suprapubic trocar site and the tip left by the appendectomy site.  The drain was then run into the pelvis    The 12 mm trocar site was closed using 0 Vicryl suture with a suture Passer    The drain was secured with 2-0 silk.      Marcaine was infiltrated    Secondary trocars were removed under direct vision. No bleeding was noted. . The skin for all ports was closed with 4-0 Monocryl subcuticular sutures.  Dermabond was placed    The patient tolerated the procedure well and was taken to the postanesthesia care unit in satisfactory condition.       Significant Surgical Tasks Conducted by the Assistant(s), if Applicable:       Estimated Blood Loss (EBL):  15-20 mL   IV fluids approximately 1 L   Marcaine 0.25% 30 mL           Implants: * No implants in log *    Specimens:   Specimen (24h ago, onward)       Start     Ordered    12/03/23 1100  Specimen to Pathology, Surgery General Surgery  Once        Comments: Pre-op Diagnosis: Acute appendicitis [K35.80]Procedure(s):APPENDECTOMY, LAPAROSCOPIC Number of specimens: 1Name of specimens: Appendix - perm     References:    Click here for ordering Quick Tip   Question Answer  Comment   Procedure Type: General Surgery    Specimen Class: Routine/Screening    Which provider would you like to cc? RO GONZALEZ    Release to patient Immediate        12/03/23 1753                            Condition: Stable    Disposition: PACU - hemodynamically stable.    Attestation: I performed the procedure.

## 2023-12-03 NOTE — PLAN OF CARE
Pt resting in bed , no distress . HOB elevated . Lap sites intact . IV infusing . Wife at bedside . Safety measures intact. Chart check completed will continue to monitor .

## 2023-12-03 NOTE — SUBJECTIVE & OBJECTIVE
No current facility-administered medications on file prior to encounter.     No current outpatient medications on file prior to encounter.       Review of patient's allergies indicates:  No Known Allergies    History reviewed. No pertinent past medical history.  History reviewed. No pertinent surgical history.  Family History    None       Tobacco Use    Smoking status: Never    Smokeless tobacco: Never   Substance and Sexual Activity    Alcohol use: Yes    Drug use: Not on file    Sexual activity: Not on file     Review of Systems   Constitutional: Negative.    HENT: Negative.     Eyes: Negative.    Respiratory: Negative.     Cardiovascular: Negative.    Gastrointestinal:  Positive for abdominal pain.   Endocrine: Negative.    Genitourinary: Negative.    Musculoskeletal: Negative.    Skin: Negative.    Allergic/Immunologic: Negative.    Neurological: Negative.    Hematological: Negative.    Psychiatric/Behavioral: Negative.       Objective:     Vital Signs (Most Recent):  Temp: 98.1 °F (36.7 °C) (12/02/23 1508)  Pulse: 110 (12/02/23 1803)  Resp: 18 (12/02/23 1712)  BP: 135/72 (12/02/23 1803)  SpO2: 95 % (12/02/23 1803) Vital Signs (24h Range):  Temp:  [98.1 °F (36.7 °C)] 98.1 °F (36.7 °C)  Pulse:  [] 110  Resp:  [18-20] 18  SpO2:  [95 %-99 %] 95 %  BP: (135-164)/(72-74) 135/72     Weight: 114.8 kg (252 lb 15.7 oz)  There is no height or weight on file to calculate BMI.     Physical Exam  Constitutional:       General: He is not in acute distress.     Appearance: He is well-developed.   HENT:      Head: Normocephalic and atraumatic.   Eyes:      General: No scleral icterus.     Pupils: Pupils are equal, round, and reactive to light.   Neck:      Thyroid: No thyromegaly.      Vascular: No JVD.      Trachea: No tracheal deviation.   Cardiovascular:      Rate and Rhythm: Normal rate and regular rhythm.      Heart sounds: Normal heart sounds.   Pulmonary:      Effort: Pulmonary effort is normal.      Breath  sounds: Rhonchi present.   Abdominal:      General: Bowel sounds are normal. There is no distension.      Palpations: Abdomen is soft. There is no mass.      Tenderness: There is no abdominal tenderness (Right lower quadrant at McBurney's). There is no guarding or rebound.      Comments: Slightly obese   Musculoskeletal:         General: Normal range of motion.      Cervical back: Normal range of motion and neck supple.   Lymphadenopathy:      Cervical: No cervical adenopathy.   Skin:     General: Skin is warm and dry.   Neurological:      Mental Status: He is alert and oriented to person, place, and time.   Psychiatric:         Behavior: Behavior normal.         Thought Content: Thought content normal.         Judgment: Judgment normal.            I have reviewed all pertinent lab results within the past 24 hours.  CBC:   Recent Labs   Lab 12/02/23  1627   WBC 15.69*   RBC 5.29   HGB 16.2   HCT 46.3      MCV 88   MCH 30.6   MCHC 35.0     BMP:   Recent Labs   Lab 12/02/23  1627   *      K 4.0      CO2 20*   BUN 10   CREATININE 1.1   CALCIUM 9.8     CMP:   Recent Labs   Lab 12/02/23  1627   *   CALCIUM 9.8   ALBUMIN 4.9   PROT 9.1*      K 4.0   CO2 20*      BUN 10   CREATININE 1.1   ALKPHOS 71   *   AST 35   BILITOT 0.8       Significant Diagnostics:  I have reviewed all pertinent imaging results/findings within the past 24 hours.  CT: I have reviewed all pertinent results/findings within the past 24 hours and my personal findings are:  Acute appendicitis    Reading Physician Reading Date Result Priority   Rohan Johnson MD  [phone] 12/2/2023 STAT     Narrative & Impression  EXAMINATION:  CT ABDOMEN PELVIS WITH IV CONTRAST     CLINICAL HISTORY:  Abdominal pain, acute, nonlocalized;     TECHNIQUE:  Low dose axial images, sagittal and coronal reformations were obtained from the lung bases to the pubic symphysis.  Contrast was not administered.      COMPARISON:  None     FINDINGS:  Heart: Normal in size. No pericardial effusion.     Lung Bases: Well aerated, without consolidation or pleural fluid.     Liver: Diffuse hepatic steatosis.  Mild hepatomegaly.     Gallbladder: No calcified gallstones.     Bile Ducts: No evidence of dilated ducts.     Pancreas: No mass or peripancreatic fat stranding.     Spleen: Unremarkable.     Adrenals: Unremarkable.     Kidneys/ Ureters: No hydronephrosis.  No obstructive uropathy.  No nonobstructive nephrolithiasis.     Bladder: No evidence of wall thickening.     Reproductive organs: Unremarkable.     GI Tract/Mesentery: Appendicitis with findings concerning for early perforation given non enhancement of a portion of the appendiceal tip such as on series 602, image 100.  Urgent surgical consultation is advised.     Peritoneal Space: Small volume right lower quadrant pelvic free fluid.  No free air.     Retroperitoneum: No significant adenopathy.     Abdominal wall: Unremarkable.     Vasculature: No significant atherosclerosis or aneurysm.     Bones: No acute fracture.     Impression:     Acute appendicitis with findings concerning for early appendiceal perforation as above.  Surgical consultation is are gently advised.     This report was flagged in Epic as abnormal.     All CT scans at this facility are performed  using dose modulation techniques as appropriate to performed exam including the following:  automated exposure control; adjustment of mA and/or kV according to the patients size (this includes techniques or standardized protocols for targeted exams where dose is matched to indication/reason for exam: i.e. extremities or head);  iterative reconstruction technique.        Electronically signed by: Rohan Johnson  Date:                                            12/02/2023  Time:                                           18:09    Urinalysis reviewed  Hepatitis reviewed   HIV reviewed

## 2023-12-03 NOTE — PROGRESS NOTES
[facility]  Progress Note    Subjective:     History of Present Illness:  28-year-old Thai-speaking presented to the emergency room with began as a dull ache about the got worse and became sharp cramping.  It the right lower quadrant.  He was evaluated in the emergency room to have this.      He is not report any associated nausea vomiting fever.  He is no past medical history.  He was no past surgical history.      He works as a     Post-Op Info:  Procedure(s) (LRB):  APPENDECTOMY, LAPAROSCOPIC (N/A)         Interval History:  Slightly less pain white blood cell count.  For appendectomy this morning    Medications:  Continuous Infusions:   lactated ringers 100 mL/hr at 12/03/23 0435     Scheduled Meds:  PRN Meds:acetaminophen, HYDROcodone-acetaminophen, HYDROcodone-acetaminophen, HYDROmorphone, LIDOcaine (PF) 10 mg/ml (1%), melatonin, metoclopramide HCl, ondansetron, sodium chloride 0.9%     Review of patient's allergies indicates:  No Known Allergies  Objective:     Vital Signs (Most Recent):  Temp: 98.8 °F (37.1 °C) (12/03/23 0737)  Pulse: (!) 118 (12/03/23 0737)  Resp: 18 (12/03/23 0737)  BP: 106/60 (12/03/23 0737)  SpO2: (!) 92 % (12/03/23 0737) Vital Signs (24h Range):  Temp:  [98.1 °F (36.7 °C)-99.1 °F (37.3 °C)] 98.8 °F (37.1 °C)  Pulse:  [] 118  Resp:  [16-20] 18  SpO2:  [92 %-99 %] 92 %  BP: (106-164)/(58-74) 106/60     Weight: 114.8 kg (252 lb 15.7 oz)  Body mass index is 33.38 kg/m².    Intake/Output - Last 3 Shifts         12/01 0700 12/02 0659 12/02 0700 12/03 0659 12/03 0700 12/04 0659    P.O.  0     I.V. (mL/kg)  89.6 (0.8)     IV Piggyback  1100     Total Intake(mL/kg)  1189.6 (10.4)     Urine (mL/kg/hr)  2     Total Output  2     Net  +1187.6            Stool Occurrence  1 x              Physical Exam  Vitals and nursing note reviewed.   Constitutional:       General: He is not in acute distress.     Appearance: He is well-developed.   HENT:       Head: Normocephalic and atraumatic.   Eyes:      General: No scleral icterus.     Pupils: Pupils are equal, round, and reactive to light.   Neck:      Thyroid: No thyromegaly.      Vascular: No JVD.      Trachea: No tracheal deviation.   Cardiovascular:      Rate and Rhythm: Normal rate and regular rhythm.      Heart sounds: Normal heart sounds.   Pulmonary:      Effort: Pulmonary effort is normal.      Breath sounds: Normal breath sounds.   Abdominal:      General: Bowel sounds are normal. There is no distension.      Palpations: Abdomen is soft. There is no mass.      Tenderness: There is no abdominal tenderness (right lower quadrant at McBurney's). There is no guarding or rebound.   Musculoskeletal:         General: Normal range of motion.      Cervical back: Normal range of motion and neck supple.   Lymphadenopathy:      Cervical: No cervical adenopathy.   Skin:     General: Skin is warm and dry.   Neurological:      Mental Status: He is alert and oriented to person, place, and time.   Psychiatric:         Mood and Affect: Mood normal.         Behavior: Behavior normal.         Thought Content: Thought content normal.         Judgment: Judgment normal.          Significant Labs:  I have reviewed all pertinent lab results within the past 24 hours.  CBC:   Recent Labs   Lab 12/03/23  0643   WBC 15.62*   RBC 4.83   HGB 14.5   HCT 42.9      MCV 89   MCH 30.0   MCHC 33.8     BMP:   Recent Labs   Lab 12/03/23  0643   *      K 4.1      CO2 23   BUN 12   CREATININE 1.1   CALCIUM 9.1       Significant Diagnostics:  I have reviewed all pertinent imaging results/findings within the past 24 hours.  No new  Assessment/Plan:     * Acute appendicitis with localized peritonitis, without perforation, abscess, or gangrene  For laparoscopic appendectomy today.      Need for surgery risks benefits and complications were discussed.  I discussed the failure rate of medical management    The need for surgery,  risks, benefits, complictions and alternative were discussed.  These included infection, bleeding, bowel or vascular injury and abscess. The need for open conversion was discussed. The risks of blood products was discussed.  Questions answered.  Consent obtained       A  was used        Zechariah Cornejo MD  General Surgery  '[facility]

## 2023-12-03 NOTE — ANESTHESIA PROCEDURE NOTES
Intubation    Date/Time: 12/3/2023 9:57 AM    Performed by: Bruce Baez CRNA  Authorized by: Bruce Baez CRNA    Intubation:     Induction:  Intravenous    Intubated:  Postinduction    Mask Ventilation:  Easy mask    Attempts:  1    Attempted By:  CRNA and staff anesthesiologist    Method of Intubation:  Direct    Blade:  Hollis 3    Laryngeal View Grade: Grade I - full view of cords      Difficult Airway Encountered?: No      Complications:  None    Airway Device:  Oral endotracheal tube    Airway Device Size:  7.5    Style/Cuff Inflation:  Cuffed (inflated to minimal occlusive pressure)    Inflation Amount (mL):  4    Tube secured:  21    Secured at:  The lips    Placement Verified By:  Capnometry    Complicating Factors:  None    Findings Post-Intubation:  BS equal bilateral and atraumatic/condition of teeth unchanged

## 2023-12-03 NOTE — TRANSFER OF CARE
"Anesthesia Transfer of Care Note    Patient: Magen Thapa    Procedure(s) Performed: Procedure(s) (LRB):  APPENDECTOMY, LAPAROSCOPIC (N/A)    Patient location: PACU    Anesthesia Type: general    Transport from OR: Transported from OR on room air with adequate spontaneous ventilation    Post pain: adequate analgesia    Post assessment: no apparent anesthetic complications and tolerated procedure well    Post vital signs: stable    Level of consciousness: responds to stimulation    Nausea/Vomiting: no nausea/vomiting    Complications: none    Transfer of care protocol was followed      Last vitals: Visit Vitals  /60 (BP Location: Left arm, Patient Position: Lying)   Pulse (!) 118   Temp 37.1 °C (98.8 °F) (Oral)   Resp 18   Ht 6' 1" (1.854 m)   Wt 114.8 kg (252 lb 15.7 oz)   SpO2 (!) 92%   BMI 33.38 kg/m²     "

## 2023-12-03 NOTE — PLAN OF CARE
Discussed POC with patient, Pt verbalized understanding, purposeful rounding every 2 hours, Fall precautions in place, patient remains injury free, IV fluids infusing LR @ 100 ml/hr, call light within reach, will continue with plan of care.       Problem: Adult Inpatient Plan of Care  Goal: Plan of Care Review  Outcome: Ongoing, Progressing  Goal: Patient-Specific Goal (Individualized)  Outcome: Ongoing, Progressing  Goal: Absence of Hospital-Acquired Illness or Injury  Outcome: Ongoing, Progressing  Goal: Optimal Comfort and Wellbeing  Outcome: Ongoing, Progressing  Goal: Readiness for Transition of Care  Outcome: Ongoing, Progressing

## 2023-12-03 NOTE — PROGRESS NOTES
[facility]  General Surgery  Progress Note    Subjective:     History of Present Illness:  28-year-old Mauritian-speaking presented to the emergency room with began as a dull ache about the got worse and became sharp cramping.  It the right lower quadrant.  He was evaluated in the emergency room to have this.      He is not report any associated nausea vomiting fever.  He is no past medical history.  He was no past surgical history.      He works as a     Post-Op Info:  Procedure(s) (LRB):  APPENDECTOMY, LAPAROSCOPIC (N/A)         No new subjective & objective note has been filed under this hospital service since the last note was generated.    Assessment/Plan:     * Acute appendicitis with localized peritonitis, without perforation, abscess, or gangrene  For laparoscopic appendectomy today.      Need for surgery risks benefits and complications were discussed.  I discussed the failure rate of medical management    The need for surgery, risks, benefits, complictions and alternative were discussed.  These included infection, bleeding, bowel or vascular injury and abscess. The need for open conversion was discussed. The risks of blood products was discussed.  Questions answered.  Consent obtained       A  was used        Zechariah Cornejo MD  General Surgery  [facility]

## 2023-12-03 NOTE — ASSESSMENT & PLAN NOTE
Admit to observation  IV antibiotics   Clear liquids  NPO after 5:00 a.m. tomorrow  Laparoscopic appendectomy.      The need for appendectomy was discussed.  The risks benefits complications were briefly reviewed.      An  was used

## 2023-12-03 NOTE — HPI
28-year-old Mohawk-speaking presented to the emergency room with began as a dull ache about the got worse and became sharp cramping.  It the right lower quadrant.  He was evaluated in the emergency room to have this.      He is not report any associated nausea vomiting fever.  He is no past medical history.  He was no past surgical history.      He works as a

## 2023-12-03 NOTE — H&P
[facility] - Emergency Dept.  General Surgery  History & Physical    Patient Name: Magen Thapa  MRN: [id number]  Admission Date: 12/2/2023  Attending Physician: Zechariah Cornejo MD   Primary Care Provider: No primary care provider on file.    Patient information was obtained from patient, spouse/SO, and ER records.     Subjective:     Chief Complaint/Reason for Admission:  Abdominal pain/acute appendicitis.  Antibiotics and appendectomy    History of Present Illness: 28-year-old Faroese-speaking presented to the emergency room with began as a dull ache about the got worse and became sharp cramping.  It the right lower quadrant.  He was evaluated in the emergency room to have this.      He is not report any associated nausea vomiting fever.  He is no past medical history.  He was no past surgical history.      He works as a     No current facility-administered medications on file prior to encounter.     No current outpatient medications on file prior to encounter.       Review of patient's allergies indicates:  No Known Allergies    History reviewed. No pertinent past medical history.  History reviewed. No pertinent surgical history.  Family History    None       Tobacco Use    Smoking status: Never    Smokeless tobacco: Never   Substance and Sexual Activity    Alcohol use: Yes    Drug use: Not on file    Sexual activity: Not on file     Review of Systems   Constitutional: Negative.    HENT: Negative.     Eyes: Negative.    Respiratory: Negative.     Cardiovascular: Negative.    Gastrointestinal:  Positive for abdominal pain.   Endocrine: Negative.    Genitourinary: Negative.    Musculoskeletal: Negative.    Skin: Negative.    Allergic/Immunologic: Negative.    Neurological: Negative.    Hematological: Negative.    Psychiatric/Behavioral: Negative.       Objective:     Vital Signs (Most Recent):  Temp: 98.1 °F (36.7 °C) (12/02/23 1508)  Pulse: 110 (12/02/23 1803)  Resp: 18 (12/02/23 1712)  BP: 135/72  (12/02/23 1803)  SpO2: 95 % (12/02/23 1803) Vital Signs (24h Range):  Temp:  [98.1 °F (36.7 °C)] 98.1 °F (36.7 °C)  Pulse:  [] 110  Resp:  [18-20] 18  SpO2:  [95 %-99 %] 95 %  BP: (135-164)/(72-74) 135/72     Weight: 114.8 kg (252 lb 15.7 oz)  There is no height or weight on file to calculate BMI.     Physical Exam  Constitutional:       General: He is not in acute distress.     Appearance: He is well-developed.   HENT:      Head: Normocephalic and atraumatic.   Eyes:      General: No scleral icterus.     Pupils: Pupils are equal, round, and reactive to light.   Neck:      Thyroid: No thyromegaly.      Vascular: No JVD.      Trachea: No tracheal deviation.   Cardiovascular:      Rate and Rhythm: Normal rate and regular rhythm.      Heart sounds: Normal heart sounds.   Pulmonary:      Effort: Pulmonary effort is normal.      Breath sounds: Rhonchi present.   Abdominal:      General: Bowel sounds are normal. There is no distension.      Palpations: Abdomen is soft. There is no mass.      Tenderness: There is no abdominal tenderness (Right lower quadrant at McBurney's). There is no guarding or rebound.      Comments: Slightly obese   Musculoskeletal:         General: Normal range of motion.      Cervical back: Normal range of motion and neck supple.   Lymphadenopathy:      Cervical: No cervical adenopathy.   Skin:     General: Skin is warm and dry.   Neurological:      Mental Status: He is alert and oriented to person, place, and time.   Psychiatric:         Behavior: Behavior normal.         Thought Content: Thought content normal.         Judgment: Judgment normal.            I have reviewed all pertinent lab results within the past 24 hours.  CBC:   Recent Labs   Lab 12/02/23  1627   WBC 15.69*   RBC 5.29   HGB 16.2   HCT 46.3      MCV 88   MCH 30.6   MCHC 35.0     BMP:   Recent Labs   Lab 12/02/23  1627   *      K 4.0      CO2 20*   BUN 10   CREATININE 1.1   CALCIUM 9.8     CMP:    Recent Labs   Lab 12/02/23  1627   *   CALCIUM 9.8   ALBUMIN 4.9   PROT 9.1*      K 4.0   CO2 20*      BUN 10   CREATININE 1.1   ALKPHOS 71   *   AST 35   BILITOT 0.8       Significant Diagnostics:  I have reviewed all pertinent imaging results/findings within the past 24 hours.  CT: I have reviewed all pertinent results/findings within the past 24 hours and my personal findings are:  Acute appendicitis    Reading Physician Reading Date Result Priority   Rohan Johnson MD  [phone] 12/2/2023 STAT     Narrative & Impression  EXAMINATION:  CT ABDOMEN PELVIS WITH IV CONTRAST     CLINICAL HISTORY:  Abdominal pain, acute, nonlocalized;     TECHNIQUE:  Low dose axial images, sagittal and coronal reformations were obtained from the lung bases to the pubic symphysis.  Contrast was not administered.     COMPARISON:  None     FINDINGS:  Heart: Normal in size. No pericardial effusion.     Lung Bases: Well aerated, without consolidation or pleural fluid.     Liver: Diffuse hepatic steatosis.  Mild hepatomegaly.     Gallbladder: No calcified gallstones.     Bile Ducts: No evidence of dilated ducts.     Pancreas: No mass or peripancreatic fat stranding.     Spleen: Unremarkable.     Adrenals: Unremarkable.     Kidneys/ Ureters: No hydronephrosis.  No obstructive uropathy.  No nonobstructive nephrolithiasis.     Bladder: No evidence of wall thickening.     Reproductive organs: Unremarkable.     GI Tract/Mesentery: Appendicitis with findings concerning for early perforation given non enhancement of a portion of the appendiceal tip such as on series 602, image 100.  Urgent surgical consultation is advised.     Peritoneal Space: Small volume right lower quadrant pelvic free fluid.  No free air.     Retroperitoneum: No significant adenopathy.     Abdominal wall: Unremarkable.     Vasculature: No significant atherosclerosis or aneurysm.     Bones: No acute fracture.     Impression:     Acute  appendicitis with findings concerning for early appendiceal perforation as above.  Surgical consultation is are gently advised.     This report was flagged in Epic as abnormal.     All CT scans at this facility are performed  using dose modulation techniques as appropriate to performed exam including the following:  automated exposure control; adjustment of mA and/or kV according to the patients size (this includes techniques or standardized protocols for targeted exams where dose is matched to indication/reason for exam: i.e. extremities or head);  iterative reconstruction technique.        Electronically signed by: Rohan Johnson  Date:                                            12/02/2023  Time:                                           18:09    Urinalysis reviewed  Hepatitis reviewed   HIV reviewed  Assessment/Plan:     Acute appendicitis with localized peritonitis, without perforation, abscess, or gangrene  Admit to observation  IV antibiotics   Clear liquids  NPO after 5:00 a.m. tomorrow  Laparoscopic appendectomy.      The need for appendectomy was discussed.  The risks benefits complications were briefly reviewed.      An  was used      VTE Risk Mitigation (From admission, onward)      None            N/A  History reviewed. No pertinent family history.    Zechariah Cornejo MD  General Surgery  [facility]

## 2023-12-03 NOTE — HOSPITAL COURSE
12/03/2023.  For appendectomy today, slightly less pain    12/04/2023: POD 1. Pain control ok, improved from pre-operatively.  Tolerating clear liquid diet without nausea and vomiting.  Urinating well.  DIANNE drain serosanguineous.    12/05/2023: POD 2. Pain control improved. Tolerating a regular diet. White count normalized. Stable for discharge to home.

## 2023-12-04 LAB
ANION GAP SERPL CALC-SCNC: 10 MMOL/L (ref 8–16)
BUN SERPL-MCNC: 11 MG/DL (ref 6–20)
CALCIUM SERPL-MCNC: 8.8 MG/DL (ref 8.7–10.5)
CHLORIDE SERPL-SCNC: 103 MMOL/L (ref 95–110)
CO2 SERPL-SCNC: 24 MMOL/L (ref 23–29)
CREAT SERPL-MCNC: 1.1 MG/DL (ref 0.5–1.4)
EST. GFR  (NO RACE VARIABLE): >60 ML/MIN/1.73 M^2
GLUCOSE SERPL-MCNC: 104 MG/DL (ref 70–110)
POTASSIUM SERPL-SCNC: 3.9 MMOL/L (ref 3.5–5.1)
SODIUM SERPL-SCNC: 137 MMOL/L (ref 136–145)

## 2023-12-04 PROCEDURE — 25000003 PHARM REV CODE 250

## 2023-12-04 PROCEDURE — 36415 COLL VENOUS BLD VENIPUNCTURE: CPT | Performed by: SURGERY

## 2023-12-04 PROCEDURE — 80048 BASIC METABOLIC PNL TOTAL CA: CPT | Performed by: SURGERY

## 2023-12-04 PROCEDURE — 25000003 PHARM REV CODE 250: Performed by: SURGERY

## 2023-12-04 PROCEDURE — 11000001 HC ACUTE MED/SURG PRIVATE ROOM

## 2023-12-04 PROCEDURE — 63600175 PHARM REV CODE 636 W HCPCS: Performed by: SURGERY

## 2023-12-04 RX ORDER — GUAIFENESIN 100 MG/5ML
200 SOLUTION ORAL EVERY 4 HOURS PRN
Status: DISCONTINUED | OUTPATIENT
Start: 2023-12-04 | End: 2023-12-05 | Stop reason: HOSPADM

## 2023-12-04 RX ORDER — BISACODYL 5 MG
5 TABLET, DELAYED RELEASE (ENTERIC COATED) ORAL ONCE
Status: COMPLETED | OUTPATIENT
Start: 2023-12-04 | End: 2023-12-04

## 2023-12-04 RX ADMIN — HYDROCODONE BITARTRATE AND ACETAMINOPHEN 1 TABLET: 7.5; 325 TABLET ORAL at 07:12

## 2023-12-04 RX ADMIN — PIPERACILLIN SODIUM AND TAZOBACTAM SODIUM 4.5 G: 4; .5 INJECTION, POWDER, FOR SOLUTION INTRAVENOUS at 06:12

## 2023-12-04 RX ADMIN — ENOXAPARIN SODIUM 40 MG: 40 INJECTION SUBCUTANEOUS at 01:12

## 2023-12-04 RX ADMIN — HYDROCODONE BITARTRATE AND ACETAMINOPHEN 1 TABLET: 5; 325 TABLET ORAL at 02:12

## 2023-12-04 RX ADMIN — ACETAMINOPHEN 650 MG: 325 TABLET ORAL at 04:12

## 2023-12-04 RX ADMIN — BISACODYL 5 MG: 5 TABLET, COATED ORAL at 10:12

## 2023-12-04 RX ADMIN — CHLORHEXIDINE GLUCONATE 0.12% ORAL RINSE 10 ML: 1.2 LIQUID ORAL at 09:12

## 2023-12-04 RX ADMIN — PIPERACILLIN SODIUM AND TAZOBACTAM SODIUM 4.5 G: 4; .5 INJECTION, POWDER, FOR SOLUTION INTRAVENOUS at 09:12

## 2023-12-04 RX ADMIN — GUAIFENESIN 200 MG: 200 SOLUTION ORAL at 04:12

## 2023-12-04 RX ADMIN — GUAIFENESIN 200 MG: 200 SOLUTION ORAL at 10:12

## 2023-12-04 RX ADMIN — HYDROCODONE BITARTRATE AND ACETAMINOPHEN 1 TABLET: 7.5; 325 TABLET ORAL at 09:12

## 2023-12-04 RX ADMIN — HYDROMORPHONE HYDROCHLORIDE 1 MG: 2 INJECTION INTRAMUSCULAR; INTRAVENOUS; SUBCUTANEOUS at 06:12

## 2023-12-04 RX ADMIN — PIPERACILLIN SODIUM AND TAZOBACTAM SODIUM 4.5 G: 4; .5 INJECTION, POWDER, FOR SOLUTION INTRAVENOUS at 03:12

## 2023-12-04 RX ADMIN — HYDROCODONE BITARTRATE AND ACETAMINOPHEN 1 TABLET: 7.5; 325 TABLET ORAL at 02:12

## 2023-12-04 RX ADMIN — CHLORHEXIDINE GLUCONATE 0.12% ORAL RINSE 10 ML: 1.2 LIQUID ORAL at 10:12

## 2023-12-04 RX ADMIN — HYDROMORPHONE HYDROCHLORIDE 1 MG: 2 INJECTION INTRAMUSCULAR; INTRAVENOUS; SUBCUTANEOUS at 10:12

## 2023-12-04 NOTE — SUBJECTIVE & OBJECTIVE
Interval History: POD 1. Pain control ok, improved from pre-operatively.  Tolerating clear liquid diet without nausea and vomiting.  Urinating well.  DIANNE drain serosanguineous.    Medications:  Continuous Infusions:   lactated ringers Stopped (12/04/23 0603)     Scheduled Meds:   bisacodyL  5 mg Oral Once    chlorhexidine  10 mL Mouth/Throat BID    enoxparin  40 mg Subcutaneous Daily    piperacillin-tazobactam (Zosyn) IV (PEDS and ADULTS) (extended infusion is not appropriate)  4.5 g Intravenous Q8H     PRN Meds:acetaminophen, HYDROcodone-acetaminophen, HYDROcodone-acetaminophen, HYDROmorphone, melatonin, metoclopramide HCl, ondansetron, sodium chloride 0.9%     Review of patient's allergies indicates:  No Known Allergies  Objective:     Vital Signs (Most Recent):  Temp: 98.9 °F (37.2 °C) (12/04/23 0717)  Pulse: 102 (12/04/23 0717)  Resp: 18 (12/04/23 0752)  BP: 113/61 (12/04/23 0717)  SpO2: (!) 93 % (12/04/23 0717) Vital Signs (24h Range):  Temp:  [98.1 °F (36.7 °C)-99 °F (37.2 °C)] 98.9 °F (37.2 °C)  Pulse:  [] 102  Resp:  [14-28] 18  SpO2:  [91 %-98 %] 93 %  BP: (106-132)/(56-69) 113/61     Weight: 114.8 kg (252 lb 15.7 oz)  Body mass index is 33.38 kg/m².    Intake/Output - Last 3 Shifts         12/02 0700  12/03 0659 12/03 0700 12/04 0659 12/04 0700 12/05 0659    P.O. 0      I.V. (mL/kg) 89.6 (0.8) 485.2 (4.2)     IV Piggyback 1100 217     Total Intake(mL/kg) 1189.6 (10.4) 702.2 (6.1)     Urine (mL/kg/hr) 2 0 (0)     Drains  220     Total Output 2 220     Net +1187.6 +482.2            Urine Occurrence  7 x     Stool Occurrence 1 x               Physical Exam  Vitals and nursing note reviewed.   Constitutional:       General: He is not in acute distress.     Appearance: He is well-developed. He is not ill-appearing.   HENT:      Head: Normocephalic and atraumatic.      Right Ear: External ear normal.      Left Ear: External ear normal.      Nose: Nose normal.      Mouth/Throat:      Mouth: Mucous  membranes are moist.   Eyes:      Extraocular Movements: Extraocular movements intact.      Conjunctiva/sclera: Conjunctivae normal.   Cardiovascular:      Rate and Rhythm: Normal rate and regular rhythm.   Pulmonary:      Effort: Pulmonary effort is normal. No respiratory distress.      Breath sounds: Normal breath sounds.   Abdominal:      Comments: Abdomen is soft and nondistended with expected david-incisional tenderness to palpation.  DIANNE drain serosanguineous, not purulent.   Musculoskeletal:      Cervical back: Normal range of motion and neck supple.   Skin:     General: Skin is warm and dry.   Neurological:      Mental Status: He is alert and oriented to person, place, and time.   Psychiatric:         Behavior: Behavior normal.          Significant Labs:  I have reviewed all pertinent lab results within the past 24 hours.  CBC:   Recent Labs   Lab 12/03/23  0643   WBC 15.62*   RBC 4.83   HGB 14.5   HCT 42.9      MCV 89   MCH 30.0   MCHC 33.8     CMP:   Recent Labs   Lab 12/02/23  1627 12/03/23  0643   * 137*   CALCIUM 9.8 9.1   ALBUMIN 4.9  --    PROT 9.1*  --     138   K 4.0 4.1   CO2 20* 23    104   BUN 10 12   CREATININE 1.1 1.1   ALKPHOS 71  --    *  --    AST 35  --    BILITOT 0.8  --        Significant Diagnostics:  I have reviewed all pertinent imaging results/findings within the past 24 hours.  No new pertinent imaging

## 2023-12-04 NOTE — PLAN OF CARE
[facility]  Initial Discharge Assessment       Primary Care Provider: No, Primary Doctor    Admission Diagnosis: Acute appendicitis with localized peritonitis, without perforation, abscess, or gangrene [K35.30]  Acute appendicitis with perforation and generalized peritonitis, without abscess, unspecified whether gangrene present [K35.201]  Acute appendicitis with appendiceal abscess [K35.33]    Admission Date: 12/2/2023  Expected Discharge Date:     Transition of Care Barriers: None    Payor: /     Extended Emergency Contact Information  Primary Emergency Contact: marycindy  Mobile Phone: [phone]  Relation: Spouse  Preferred language: Hungarian   needed? No              [id number] [place] - [address]  [address]  [place] [address]  Phone: [phone] Fax: [phone]      Initial Assessment (most recent)       Adult Discharge Assessment - 12/04/23 1200          Discharge Assessment    Assessment Type Discharge Planning Assessment     Source of Information health record     People in Home alone     Do you expect to return to your current living situation? Yes     Do you have help at home or someone to help you manage your care at home? Yes     Prior to hospitilization cognitive status: Alert/Oriented     Current cognitive status: Alert/Oriented     Walking or Climbing Stairs --   independent    Dressing/Bathing --   independent    Equipment Currently Used at Home none     Readmission within 30 days? No     Patient currently being followed by outpatient case management? No     Do you currently have service(s) that help you manage your care at home? No     Do you take prescription medications? No     Do you have prescription coverage? No     Do you have any problems affording any of your prescribed medications? TBD     Is the patient taking medications as prescribed? no     How do you get to doctors appointments? family or friend will provide     Are you  on dialysis? No     Do you take coumadin? No     DME Needed Upon Discharge  none     Transition of Care Barriers None                    Independent with ADLs prior to admission.  Self insured.

## 2023-12-04 NOTE — PLAN OF CARE
Discussed POC with patient, Pt verbalized understanding, purposeful rounding every 2 hours, IS encouraged/used every hour,  Fall precautions in place, patient remains injury free, IV fluids infusing LR @ 80 ml/hr, call light within reach, will continue with plan of care.     Problem: Adult Inpatient Plan of Care  Goal: Plan of Care Review  Outcome: Ongoing, Progressing  Goal: Patient-Specific Goal (Individualized)  Outcome: Ongoing, Progressing  Goal: Absence of Hospital-Acquired Illness or Injury  Outcome: Ongoing, Progressing  Goal: Optimal Comfort and Wellbeing  Outcome: Ongoing, Progressing  Goal: Readiness for Transition of Care  Outcome: Ongoing, Progressing

## 2023-12-04 NOTE — PROGRESS NOTES
[facility] Surg  General Surgery  Progress Note    Subjective:     History of Present Illness:  28-year-old [place] Islander-speaking presented to the emergency room with began as a dull ache about the got worse and became sharp cramping.  It the right lower quadrant.  He was evaluated in the emergency room to have this.      He is not report any associated nausea vomiting fever.  He is no past medical history.  He was no past surgical history.      He works as a     Post-Op Info:  Procedure(s) (LRB):  APPENDECTOMY, LAPAROSCOPIC (N/A)   1 Day Post-Op     Interval History: POD 1. Pain control ok, improved from pre-operatively.  Tolerating clear liquid diet without nausea and vomiting.  Urinating well.  DIANNE drain serosanguineous.    Medications:  Continuous Infusions:   lactated ringers Stopped (12/04/23 0603)     Scheduled Meds:   bisacodyL  5 mg Oral Once    chlorhexidine  10 mL Mouth/Throat BID    enoxparin  40 mg Subcutaneous Daily    piperacillin-tazobactam (Zosyn) IV (PEDS and ADULTS) (extended infusion is not appropriate)  4.5 g Intravenous Q8H     PRN Meds:acetaminophen, HYDROcodone-acetaminophen, HYDROcodone-acetaminophen, HYDROmorphone, melatonin, metoclopramide HCl, ondansetron, sodium chloride 0.9%     Review of patient's allergies indicates:  No Known Allergies  Objective:     Vital Signs (Most Recent):  Temp: 98.9 °F (37.2 °C) (12/04/23 0717)  Pulse: 102 (12/04/23 0717)  Resp: 18 (12/04/23 0752)  BP: 113/61 (12/04/23 0717)  SpO2: (!) 93 % (12/04/23 0717) Vital Signs (24h Range):  Temp:  [98.1 °F (36.7 °C)-99 °F (37.2 °C)] 98.9 °F (37.2 °C)  Pulse:  [] 102  Resp:  [14-28] 18  SpO2:  [91 %-98 %] 93 %  BP: (106-132)/(56-69) 113/61     Weight: 114.8 kg (252 lb 15.7 oz)  Body mass index is 33.38 kg/m².    Intake/Output - Last 3 Shifts         12/02 0700 12/03 0659 12/03 0700 12/04 0659 12/04 0700 12/05 0659    P.O. 0      I.V. (mL/kg) 89.6 (0.8) 485.2 (4.2)     IV Piggyback 1100 217     Total  Intake(mL/kg) 1189.6 (10.4) 702.2 (6.1)     Urine (mL/kg/hr) 2 0 (0)     Drains  220     Total Output 2 220     Net +1187.6 +482.2            Urine Occurrence  7 x     Stool Occurrence 1 x               Physical Exam  Vitals and nursing note reviewed.   Constitutional:       General: He is not in acute distress.     Appearance: He is well-developed. He is not ill-appearing.   HENT:      Head: Normocephalic and atraumatic.      Right Ear: External ear normal.      Left Ear: External ear normal.      Nose: Nose normal.      Mouth/Throat:      Mouth: Mucous membranes are moist.   Eyes:      Extraocular Movements: Extraocular movements intact.      Conjunctiva/sclera: Conjunctivae normal.   Cardiovascular:      Rate and Rhythm: Normal rate and regular rhythm.   Pulmonary:      Effort: Pulmonary effort is normal. No respiratory distress.      Breath sounds: Normal breath sounds.   Abdominal:      Comments: Abdomen is soft and nondistended with expected david-incisional tenderness to palpation.  DIANNE drain serosanguineous, not purulent.   Musculoskeletal:      Cervical back: Normal range of motion and neck supple.   Skin:     General: Skin is warm and dry.   Neurological:      Mental Status: He is alert and oriented to person, place, and time.   Psychiatric:         Behavior: Behavior normal.          Significant Labs:  I have reviewed all pertinent lab results within the past 24 hours.  CBC:   Recent Labs   Lab 12/03/23  0643   WBC 15.62*   RBC 4.83   HGB 14.5   HCT 42.9      MCV 89   MCH 30.0   MCHC 33.8     CMP:   Recent Labs   Lab 12/02/23  1627 12/03/23  0643   * 137*   CALCIUM 9.8 9.1   ALBUMIN 4.9  --    PROT 9.1*  --     138   K 4.0 4.1   CO2 20* 23    104   BUN 10 12   CREATININE 1.1 1.1   ALKPHOS 71  --    *  --    AST 35  --    BILITOT 0.8  --        Significant Diagnostics:  I have reviewed all pertinent imaging results/findings within the past 24 hours.  No new pertinent  imaging  Assessment/Plan:     * Acute appendicitis with localized peritonitis, without perforation, abscess, or gangrene  Postop day 1 status post laparoscopic appendectomy with drainage of abscess, DIANNE drain in place.      - advanced to regular diet, monitor for tolerance.    - p.r.n. pain and nausea control.  - continue IV antibiotics.  - monitor DIANNE drain output and appearance.    - ambulate.    - incentive spirometry.         Juani Maria PA-C  General Surgery  [facility]

## 2023-12-04 NOTE — ASSESSMENT & PLAN NOTE
Postop day 1 status post laparoscopic appendectomy with drainage of abscess, DIANNE drain in place.      - advanced to regular diet, monitor for tolerance.    - p.r.n. pain and nausea control.  - continue IV antibiotics.  - monitor DIANNE drain output and appearance.    - ambulate.    - incentive spirometry.

## 2023-12-05 VITALS
HEART RATE: 90 BPM | RESPIRATION RATE: 18 BRPM | WEIGHT: 253 LBS | OXYGEN SATURATION: 95 % | SYSTOLIC BLOOD PRESSURE: 122 MMHG | HEIGHT: 73 IN | BODY MASS INDEX: 33.53 KG/M2 | TEMPERATURE: 100 F | DIASTOLIC BLOOD PRESSURE: 78 MMHG

## 2023-12-05 PROBLEM — K35.30 ACUTE APPENDICITIS WITH LOCALIZED PERITONITIS, WITHOUT PERFORATION, ABSCESS, OR GANGRENE: Status: RESOLVED | Noted: 2023-12-02 | Resolved: 2023-12-05

## 2023-12-05 LAB
BASOPHILS # BLD AUTO: 0.02 K/UL (ref 0–0.2)
BASOPHILS NFR BLD: 0.2 % (ref 0–1.9)
DIFFERENTIAL METHOD: ABNORMAL
EOSINOPHIL # BLD AUTO: 0.1 K/UL (ref 0–0.5)
EOSINOPHIL NFR BLD: 1.4 % (ref 0–8)
ERYTHROCYTE [DISTWIDTH] IN BLOOD BY AUTOMATED COUNT: 12.1 % (ref 11.5–14.5)
HCT VFR BLD AUTO: 37.1 % (ref 40–54)
HGB BLD-MCNC: 12.4 G/DL (ref 14–18)
IMM GRANULOCYTES # BLD AUTO: 0.05 K/UL (ref 0–0.04)
IMM GRANULOCYTES NFR BLD AUTO: 0.5 % (ref 0–0.5)
LYMPHOCYTES # BLD AUTO: 1.1 K/UL (ref 1–4.8)
LYMPHOCYTES NFR BLD: 12.2 % (ref 18–48)
MCH RBC QN AUTO: 30.2 PG (ref 27–31)
MCHC RBC AUTO-ENTMCNC: 33.4 G/DL (ref 32–36)
MCV RBC AUTO: 90 FL (ref 82–98)
MONOCYTES # BLD AUTO: 0.5 K/UL (ref 0.3–1)
MONOCYTES NFR BLD: 5.8 % (ref 4–15)
NEUTROPHILS # BLD AUTO: 7.4 K/UL (ref 1.8–7.7)
NEUTROPHILS NFR BLD: 79.9 % (ref 38–73)
NRBC BLD-RTO: 0 /100 WBC
PLATELET # BLD AUTO: 184 K/UL (ref 150–450)
PMV BLD AUTO: 11.3 FL (ref 9.2–12.9)
RBC # BLD AUTO: 4.11 M/UL (ref 4.6–6.2)
WBC # BLD AUTO: 9.32 K/UL (ref 3.9–12.7)

## 2023-12-05 PROCEDURE — 36415 COLL VENOUS BLD VENIPUNCTURE: CPT | Performed by: SURGERY

## 2023-12-05 PROCEDURE — 25000003 PHARM REV CODE 250: Performed by: SURGERY

## 2023-12-05 PROCEDURE — 85025 COMPLETE CBC W/AUTO DIFF WBC: CPT | Performed by: SURGERY

## 2023-12-05 PROCEDURE — 63600175 PHARM REV CODE 636 W HCPCS: Performed by: SURGERY

## 2023-12-05 PROCEDURE — 94799 UNLISTED PULMONARY SVC/PX: CPT

## 2023-12-05 RX ORDER — AMOXICILLIN AND CLAVULANATE POTASSIUM 875; 125 MG/1; MG/1
1 TABLET, FILM COATED ORAL EVERY 12 HOURS
Qty: 10 TABLET | Refills: 0 | Status: SHIPPED | OUTPATIENT
Start: 2023-12-05 | End: 2023-12-10

## 2023-12-05 RX ORDER — ONDANSETRON 4 MG/1
4 TABLET, ORALLY DISINTEGRATING ORAL EVERY 8 HOURS PRN
Qty: 30 TABLET | Refills: 0 | Status: SHIPPED | OUTPATIENT
Start: 2023-12-05 | End: 2023-12-18

## 2023-12-05 RX ORDER — OXYCODONE HYDROCHLORIDE 5 MG/1
5 TABLET ORAL EVERY 6 HOURS PRN
Qty: 25 TABLET | Refills: 0 | Status: SHIPPED | OUTPATIENT
Start: 2023-12-05 | End: 2023-12-18

## 2023-12-05 RX ADMIN — ACETAMINOPHEN 650 MG: 325 TABLET ORAL at 05:12

## 2023-12-05 RX ADMIN — CHLORHEXIDINE GLUCONATE 0.12% ORAL RINSE 10 ML: 1.2 LIQUID ORAL at 09:12

## 2023-12-05 RX ADMIN — SODIUM CHLORIDE, POTASSIUM CHLORIDE, SODIUM LACTATE AND CALCIUM CHLORIDE: 600; 310; 30; 20 INJECTION, SOLUTION INTRAVENOUS at 12:12

## 2023-12-05 RX ADMIN — PIPERACILLIN SODIUM AND TAZOBACTAM SODIUM 4.5 G: 4; .5 INJECTION, POWDER, FOR SOLUTION INTRAVENOUS at 06:12

## 2023-12-05 RX ADMIN — HYDROCODONE BITARTRATE AND ACETAMINOPHEN 1 TABLET: 5; 325 TABLET ORAL at 09:12

## 2023-12-05 RX ADMIN — HYDROCODONE BITARTRATE AND ACETAMINOPHEN 1 TABLET: 7.5; 325 TABLET ORAL at 05:12

## 2023-12-05 NOTE — PROGRESS NOTES
[facility] S[facility]  General Surgery  Progress Note    Subjective:     History of Present Illness:  28-year-old Bermudian-speaking presented to the emergency room with began as a dull ache about the got worse and became sharp cramping.  It the right lower quadrant.  He was evaluated in the emergency room to have this.      He is not report any associated nausea vomiting fever.  He is no past medical history.  He was no past surgical history.      He works as a     Post-Op Info:  Procedure(s) (LRB):  APPENDECTOMY, LAPAROSCOPIC (N/A)   2 Days Post-Op     No new subjective & objective note has been filed under this hospital service since the last note was generated.    Assessment/Plan:     * Acute appendicitis with localized peritonitis, without perforation, abscess, or gangrene  Postop day 2 status post laparoscopic appendectomy with drainage of abscess, DIANNE drain in place.      - Stable for discharge to home on PO antibiotics with PRN pain control  - D/C with DIANNE drain, education on care  - F/U in clinic on Monday for drain check        Juani Maria PA-C  General Surgery  [facility]

## 2023-12-05 NOTE — PROGRESS NOTES
[facility] Surg  General Surgery  Progress Note    Subjective:     History of Present Illness:  28-year-old Lebanese-speaking presented to the emergency room with began as a dull ache about the got worse and became sharp cramping.  It the right lower quadrant.  He was evaluated in the emergency room to have this.      He is not report any associated nausea vomiting fever.  He is no past medical history.  He was no past surgical history.      He works as a     Post-Op Info:  Procedure(s) (LRB):  APPENDECTOMY, LAPAROSCOPIC (N/A)   2 Days Post-Op     Interval History: POD 2. Pain control improved. Tolerating a regular diet. White count normalized. Stable for discharge to home.     Medications:  Continuous Infusions:   lactated ringers 80 mL/hr at 12/05/23 0019     Scheduled Meds:   chlorhexidine  10 mL Mouth/Throat BID    enoxparin  40 mg Subcutaneous Daily    piperacillin-tazobactam (Zosyn) IV (PEDS and ADULTS) (extended infusion is not appropriate)  4.5 g Intravenous Q8H     PRN Meds:acetaminophen, guaiFENesin 100 mg/5 ml, HYDROcodone-acetaminophen, HYDROcodone-acetaminophen, HYDROmorphone, melatonin, metoclopramide HCl, ondansetron, sodium chloride 0.9%     Review of patient's allergies indicates:  No Known Allergies  Objective:     Vital Signs (Most Recent):  Temp: 98.8 °F (37.1 °C) (12/05/23 0500)  Pulse: 92 (12/05/23 0500)  Resp: 19 (12/05/23 0501)  BP: 118/62 (12/05/23 0500)  SpO2: (!) 94 % (12/05/23 0500) Vital Signs (24h Range):  Temp:  [98.2 °F (36.8 °C)-99.5 °F (37.5 °C)] 98.8 °F (37.1 °C)  Pulse:  [] 92  Resp:  [16-20] 19  SpO2:  [92 %-94 %] 94 %  BP: (112-138)/(62-76) 118/62     Weight: 114.8 kg (252 lb 15.7 oz)  Body mass index is 33.38 kg/m².    Intake/Output - Last 3 Shifts         12/03 0700  12/04 0659 12/04 0700 12/05 0659 12/05 0700 12/06 0659    P.O.       I.V. (mL/kg) 485.2 (4.2) 74.2 (0.6)     IV Piggyback 217 149.9     Total Intake(mL/kg) 702.2 (6.1) 224.1 (2)     Urine  (mL/kg/hr) 0 (0) 0 (0)     Drains 220 110     Stool  0     Total Output 220 110     Net +482.2 +114.1            Urine Occurrence 7 x 1 x     Stool Occurrence  1 x              Physical Exam  Vitals reviewed.   Constitutional:       General: He is not in acute distress.     Appearance: He is well-developed.   HENT:      Head: Normocephalic and atraumatic.      Right Ear: External ear normal.      Left Ear: External ear normal.      Nose: Nose normal.      Mouth/Throat:      Mouth: Mucous membranes are moist.   Eyes:      Extraocular Movements: Extraocular movements intact.      Conjunctiva/sclera: Conjunctivae normal.   Cardiovascular:      Rate and Rhythm: Normal rate.   Pulmonary:      Effort: Pulmonary effort is normal. No respiratory distress.   Abdominal:      Comments: Abdomen soft, NT, and ND. Incisions CDI, no SOI. DIANNE drain serosanguinous.    Musculoskeletal:      Cervical back: Normal range of motion and neck supple.   Skin:     General: Skin is warm and dry.   Neurological:      Mental Status: He is alert and oriented to person, place, and time.   Psychiatric:         Behavior: Behavior normal.          Significant Labs:  I have reviewed all pertinent lab results within the past 24 hours.  CBC:   Recent Labs   Lab 12/05/23  0529   WBC 9.32   RBC 4.11*   HGB 12.4*   HCT 37.1*      MCV 90   MCH 30.2   MCHC 33.4     CMP:   Recent Labs   Lab 12/02/23  1627 12/03/23  0643 12/04/23  0716   *   < > 104   CALCIUM 9.8   < > 8.8   ALBUMIN 4.9  --   --    PROT 9.1*  --   --       < > 137   K 4.0   < > 3.9   CO2 20*   < > 24      < > 103   BUN 10   < > 11   CREATININE 1.1   < > 1.1   ALKPHOS 71  --   --    *  --   --    AST 35  --   --    BILITOT 0.8  --   --     < > = values in this interval not displayed.       Significant Diagnostics:  I have reviewed all pertinent imaging results/findings within the past 24 hours.  No new pertinent imaging  Assessment/Plan:     * Acute appendicitis  with localized peritonitis, without perforation, abscess, or gangrene  Postop day 2 status post laparoscopic appendectomy with drainage of abscess, DIANNE drain in place.      - Stable for discharge to home on PO antibiotics with PRN pain control  - D/C with DIANNE drain, education on care  - F/U in clinic on Monday for drain check        Juani Maria PA-C  General Surgery  [facility]

## 2023-12-05 NOTE — PLAN OF CARE
[facility]  Discharge Final Note    Primary Care Provider: No, Primary Doctor    Expected Discharge Date: 12/5/2023    Final Discharge Note (most recent)       Final Note - 12/05/23 0845          Final Note    Assessment Type Final Discharge Note     Anticipated Discharge Disposition Home or Self Care        Post-Acute Status    Discharge Delays None known at this time                   Contact Info       Juani Maria PA-C   Specialty: General Surgery    [address]  [place] [address]   Phone: [phone]       Next Steps: Follow up on 12/11/2023    Instructions: Post-op appy drain check          No d/c  needs

## 2023-12-05 NOTE — DISCHARGE INSTRUCTIONS
Llame si tiene fiebre, aumento del dolor, náuseas, vómitos, enrojecimiento o drenaje de las incisiones.    No levantar más de 20 libras broderick 2 semanas.    No conducir si está tomando analgésicos.    puede ducharse    Se retira el pegamento cuando se suelta.    Si se estriñe debido a los analgésicos, puede usar laxantes de venta ronna, Miralax o Glycolax, o leche de magnesia para el estreñimiento severo.    Los teléfonos de nuestras oficinas son  y     El número de fax de nuestra oficina es #

## 2023-12-05 NOTE — NURSING
With the help of the beth discharge instruction gone over with patient and wife.  Educated both on DIANNE drain care and how to empty it. Both verbalizes understanding.

## 2023-12-05 NOTE — PLAN OF CARE
Discussed poc with pt, pt verbalized understanding    Purposeful rounding every 2hours    VS wnl  Fall precautions in place, remains injury free  Pt denies c/o pain or discomfort at this time and patient will be discharged to home to self care.  Pain and nausea under control with PRN meds    IVFs  Accurate I&Os  Abx given as prescribed  Bed locked at lowest position  Call light within reach    Chart check complete  Will cont with POC

## 2023-12-05 NOTE — ASSESSMENT & PLAN NOTE
Postop day 2 status post laparoscopic appendectomy with drainage of abscess, DAINNE drain in place.      - Stable for discharge to home on PO antibiotics with PRN pain control  - D/C with DIANNE drain, education on care  - F/U in clinic on Monday for drain check

## 2023-12-05 NOTE — SUBJECTIVE & OBJECTIVE
Interval History: POD 2. Pain control improved. Tolerating a regular diet. White count normalized. Stable for discharge to home.     Medications:  Continuous Infusions:   lactated ringers 80 mL/hr at 12/05/23 0019     Scheduled Meds:   chlorhexidine  10 mL Mouth/Throat BID    enoxparin  40 mg Subcutaneous Daily    piperacillin-tazobactam (Zosyn) IV (PEDS and ADULTS) (extended infusion is not appropriate)  4.5 g Intravenous Q8H     PRN Meds:acetaminophen, guaiFENesin 100 mg/5 ml, HYDROcodone-acetaminophen, HYDROcodone-acetaminophen, HYDROmorphone, melatonin, metoclopramide HCl, ondansetron, sodium chloride 0.9%     Review of patient's allergies indicates:  No Known Allergies  Objective:     Vital Signs (Most Recent):  Temp: 98.8 °F (37.1 °C) (12/05/23 0500)  Pulse: 92 (12/05/23 0500)  Resp: 19 (12/05/23 0501)  BP: 118/62 (12/05/23 0500)  SpO2: (!) 94 % (12/05/23 0500) Vital Signs (24h Range):  Temp:  [98.2 °F (36.8 °C)-99.5 °F (37.5 °C)] 98.8 °F (37.1 °C)  Pulse:  [] 92  Resp:  [16-20] 19  SpO2:  [92 %-94 %] 94 %  BP: (112-138)/(62-76) 118/62     Weight: 114.8 kg (252 lb 15.7 oz)  Body mass index is 33.38 kg/m².    Intake/Output - Last 3 Shifts         12/03 0700  12/04 0659 12/04 0700 12/05 0659 12/05 0700 12/06 0659    P.O.       I.V. (mL/kg) 485.2 (4.2) 74.2 (0.6)     IV Piggyback 217 149.9     Total Intake(mL/kg) 702.2 (6.1) 224.1 (2)     Urine (mL/kg/hr) 0 (0) 0 (0)     Drains 220 110     Stool  0     Total Output 220 110     Net +482.2 +114.1            Urine Occurrence 7 x 1 x     Stool Occurrence  1 x              Physical Exam  Vitals reviewed.   Constitutional:       General: He is not in acute distress.     Appearance: He is well-developed.   HENT:      Head: Normocephalic and atraumatic.      Right Ear: External ear normal.      Left Ear: External ear normal.      Nose: Nose normal.      Mouth/Throat:      Mouth: Mucous membranes are moist.   Eyes:      Extraocular Movements: Extraocular movements  intact.      Conjunctiva/sclera: Conjunctivae normal.   Cardiovascular:      Rate and Rhythm: Normal rate.   Pulmonary:      Effort: Pulmonary effort is normal. No respiratory distress.   Abdominal:      Comments: Abdomen soft, NT, and ND. Incisions CDI, no SOI. DIANNE drain serosanguinous.    Musculoskeletal:      Cervical back: Normal range of motion and neck supple.   Skin:     General: Skin is warm and dry.   Neurological:      Mental Status: He is alert and oriented to person, place, and time.   Psychiatric:         Behavior: Behavior normal.          Significant Labs:  I have reviewed all pertinent lab results within the past 24 hours.  CBC:   Recent Labs   Lab 12/05/23  0529   WBC 9.32   RBC 4.11*   HGB 12.4*   HCT 37.1*      MCV 90   MCH 30.2   MCHC 33.4     CMP:   Recent Labs   Lab 12/02/23  1627 12/03/23  0643 12/04/23  0716   *   < > 104   CALCIUM 9.8   < > 8.8   ALBUMIN 4.9  --   --    PROT 9.1*  --   --       < > 137   K 4.0   < > 3.9   CO2 20*   < > 24      < > 103   BUN 10   < > 11   CREATININE 1.1   < > 1.1   ALKPHOS 71  --   --    *  --   --    AST 35  --   --    BILITOT 0.8  --   --     < > = values in this interval not displayed.       Significant Diagnostics:  I have reviewed all pertinent imaging results/findings within the past 24 hours.  No new pertinent imaging

## 2023-12-05 NOTE — PLAN OF CARE
Discussed poc with pt, pt verbalized understanding    Purposeful rounding every 2hours    VS wnl  Fall precautions in place, remains injury free  Pt still c/o abdominal pain and requires frequent doses of pain medication,  Pain and nausea under control with PRN meds    IVFs  Accurate I&Os  Abx given as prescribed  Bed locked at lowest position  Call light within reach    Chart check complete  Will cont with POC

## 2023-12-05 NOTE — DISCHARGE SUMMARY
[facility]  General Surgery  Discharge Summary      Patient Name: Magen Thapa  MRN: [id number]  Admission Date: 12/2/2023  Hospital Length of Stay: 2 days  Discharge Date and Time:  12/05/2023 7:29 AM  Attending Physician: Zechariah Cornejo MD   Discharging Provider: Juani Maria PA-C  Primary Care Provider: Caridad, Primary Doctor    HPI:   28-year-old Indonesian-speaking presented to the emergency room with began as a dull ache about the got worse and became sharp cramping.  It the right lower quadrant.  He was evaluated in the emergency room to have this.      He is not report any associated nausea vomiting fever.  He is no past medical history.  He was no past surgical history.      He works as a     Procedure(s) (LRB):  APPENDECTOMY, LAPAROSCOPIC (N/A)      Indwelling Lines/Drains at time of discharge:   Lines/Drains/Airways       Drain  Duration                  Closed/Suction Drain 12/03/23 Tube - 1 Left Abdomen 15 Fr. 2 days                  Hospital Course: 12/03/2023.  For appendectomy today, slightly less pain    12/04/2023: POD 1. Pain control ok, improved from pre-operatively.  Tolerating clear liquid diet without nausea and vomiting.  Urinating well.  DIANNE drain serosanguineous.    12/05/2023: POD 2. Pain control improved. Tolerating a regular diet. White count normalized. Stable for discharge to home.    Goals of Care Treatment Preferences:  Code Status: Full Code        Significant Diagnostic Studies: Labs: CMP   Recent Labs   Lab 12/04/23  0716      K 3.9      CO2 24      BUN 11   CREATININE 1.1   CALCIUM 8.8   ANIONGAP 10    and CBC   Recent Labs   Lab 12/05/23  0529   WBC 9.32   HGB 12.4*   HCT 37.1*          Pending Diagnostic Studies:       Procedure Component Value Units Date/Time    Specimen to Pathology, Surgery General Surgery [[id number]] Collected: 12/03/23 1059    Order Status: Sent Lab Status: In process Updated: 12/04/23 0923    Specimen:  Tissue           Final Active Diagnoses:      Problems Resolved During this Admission:    Diagnosis Date Noted Date Resolved POA    PRINCIPAL PROBLEM:  Acute appendicitis with localized peritonitis, without perforation, abscess, or gangrene [K35.30] 12/02/2023 12/05/2023 Yes      Discharged Condition: good    Disposition: Home or Self Care    Follow Up:   Follow-up Information       Juani Maria PA-C Follow up on 12/11/2023.    Specialty: General Surgery  Why: Post-op appy drain check  Contact information:  [address]  [place] [address] [phone]                           Patient Instructions:      Lifting restrictions   Order Comments: No lifting over 20 pounds for 2 weeks     Notify your health care provider if you experience any of the following:  increased confusion or weakness     Notify your health care provider if you experience any of the following:  persistent dizziness, light-headedness, or visual disturbances     Notify your health care provider if you experience any of the following:  worsening rash     Notify your health care provider if you experience any of the following:  severe persistent headache     Notify your health care provider if you experience any of the following:  difficulty breathing or increased cough     Notify your health care provider if you experience any of the following:  redness, tenderness, or signs of infection (pain, swelling, redness, odor or green/yellow discharge around incision site)     Notify your health care provider if you experience any of the following:  severe uncontrolled pain     Notify your health care provider if you experience any of the following:  persistent nausea and vomiting or diarrhea     Notify your health care provider if you experience any of the following:  temperature >100.4     Shower on day dressing removed (No bath)   Order Comments: Try to not get the drain site saturated. You can shower, but do not submerge incisions  under water.     Medications:  Reconciled Home Medications:      Medication List        START taking these medications      amoxicillin-clavulanate 875-125mg 875-125 mg per tablet  Commonly known as: AUGMENTIN  Take 1 tablet by mouth every 12 (twelve) hours. for 5 days     ondansetron 4 MG Tbdl  Commonly known as: ZOFRAN-ODT  Take 1 tablet (4 mg total) by mouth every 8 (eight) hours as needed (nausea).     oxyCODONE 5 MG immediate release tablet  Commonly known as: ROXICODONE  Take 1 tablet (5 mg total) by mouth every 6 (six) hours as needed for Pain.            Time spent on the discharge of patient: 20 minutes    Juani Maria PA-C  General Surgery  [facility]

## 2023-12-05 NOTE — PLAN OF CARE
Problem: Adult Inpatient Plan of Care  Goal: Plan of Care Review  Outcome: Ongoing, Progressing  Flowsheets (Taken 12/5/2023 0520)  Plan of Care Reviewed With: patient     Problem: Fall Injury Risk  Goal: Absence of Fall and Fall-Related Injury  Outcome: Ongoing, Progressing       POC reviewed with pt. verbalized understanding. IV fluids infusing. IV abx infusing. Pain managed with PRNs. DIANNE drain care. Surgical site c/d/I. all safety measures maintained. Will continue to monitor.

## 2023-12-06 LAB
FINAL PATHOLOGIC DIAGNOSIS: NORMAL
GROSS: NORMAL
Lab: NORMAL

## 2023-12-11 ENCOUNTER — OFFICE VISIT (OUTPATIENT)
Dept: SURGERY | Facility: CLINIC | Age: 28
End: 2023-12-11

## 2023-12-11 VITALS
HEART RATE: 85 BPM | SYSTOLIC BLOOD PRESSURE: 116 MMHG | DIASTOLIC BLOOD PRESSURE: 73 MMHG | BODY MASS INDEX: 31.66 KG/M2 | WEIGHT: 240 LBS

## 2023-12-11 DIAGNOSIS — K35.30 ACUTE APPENDICITIS WITH LOCALIZED PERITONITIS AND ABSCESS, WITHOUT GANGRENE OR PERFORATION: Primary | ICD-10-CM

## 2023-12-11 PROCEDURE — 99999 PR PBB SHADOW E&M-EST. PATIENT-LVL III: ICD-10-PCS | Mod: PBBFAC,,,

## 2023-12-11 PROCEDURE — 99213 OFFICE O/P EST LOW 20 MIN: CPT | Mod: PBBFAC

## 2023-12-11 PROCEDURE — 99024 PR POST-OP FOLLOW-UP VISIT: ICD-10-PCS | Mod: ,,,

## 2023-12-11 PROCEDURE — 99999 PR PBB SHADOW E&M-EST. PATIENT-LVL III: CPT | Mod: PBBFAC,,,

## 2023-12-11 PROCEDURE — 99024 POSTOP FOLLOW-UP VISIT: CPT | Mod: ,,,

## 2023-12-11 NOTE — PROGRESS NOTES
History & Physical    SUBJECTIVE:     Interpretive services were used to aid in the completion of this visit.    History of Present Illness:  Patient is a 28 y.o. male presents for postoperative evaluation status post laparoscopic appendectomy secondary to acute appendicitis with periappendiceal abscess.  He has a DIANNE drain in place.  He last emptied it yesterday morning, and it currently has less than 5 cc of serous drainage.  He is tolerating a regular diet and having normal bowel movements.  He denies any difficulty urinating, dysuria, and hematuria.  He denies any fevers, chills, nausea, and vomiting.  He denies any abdominal pain.    Chief Complaint   Patient presents with    Post-op Evaluation     S/p Lap appendectomy, drain in place       Review of patient's allergies indicates:  No Known Allergies    Current Outpatient Medications   Medication Sig Dispense Refill    ondansetron (ZOFRAN-ODT) 4 MG TbDL Take 1 tablet (4 mg total) by mouth every 8 (eight) hours as needed (nausea). 30 tablet 0    oxyCODONE (ROXICODONE) 5 MG immediate release tablet Take 1 tablet (5 mg total) by mouth every 6 (six) hours as needed for Pain. 25 tablet 0     No current facility-administered medications for this visit.       History reviewed. No pertinent past medical history.  Past Surgical History:   Procedure Laterality Date    LAPAROSCOPIC APPENDECTOMY N/A 12/3/2023    Procedure: APPENDECTOMY, LAPAROSCOPIC;  Surgeon: Zechariah Cornejo MD;  Location: [facility];  Service: General;  Laterality: N/A;     History reviewed. No pertinent family history.  Social History     Tobacco Use    Smoking status: Never    Smokeless tobacco: Never   Substance Use Topics    Alcohol use: Yes        Review of Systems:  Review of Systems   Constitutional:  Negative for chills, fever and unexpected weight change.   HENT:  Negative for congestion.    Eyes:  Negative for visual disturbance.   Respiratory:  Negative for shortness of breath.     Cardiovascular:  Negative for chest pain.   Gastrointestinal:  Negative for abdominal distention, abdominal pain, constipation, nausea, rectal pain and vomiting.   Genitourinary:  Negative for dysuria.   Musculoskeletal:  Negative for arthralgias.   Skin:  Negative for rash.   Neurological:  Negative for light-headedness.   Hematological:  Negative for adenopathy.       OBJECTIVE:     Vital Signs (Most Recent)  Pulse: 85 (12/11/23 1206)  BP: 116/73 (12/11/23 1206)     108.9 kg (240 lb)     Physical Exam:  Physical Exam  Vitals and nursing note reviewed.   Constitutional:       General: He is not in acute distress.     Appearance: He is well-developed. He is not ill-appearing.   HENT:      Head: Normocephalic and atraumatic.      Right Ear: External ear normal.      Left Ear: External ear normal.      Nose: Nose normal.      Mouth/Throat:      Mouth: Mucous membranes are moist.   Eyes:      Extraocular Movements: Extraocular movements intact.   Cardiovascular:      Rate and Rhythm: Normal rate.   Pulmonary:      Effort: Pulmonary effort is normal. No respiratory distress.   Abdominal:      Comments:  Abdomen is soft and nondistended with expected david-incisional tenderness to palpation.  DIANNE drain in place with very minimal serous drainage, removed without issue and bandage applied to site.  There is a small amount of fibrinous exudate at the left lower quadrant incision site but no surrounding erythema or induration to suggest infection.   Musculoskeletal:      Cervical back: Normal range of motion and neck supple.   Skin:     General: Skin is warm and dry.   Neurological:      Mental Status: He is alert and oriented to person, place, and time.   Psychiatric:         Mood and Affect: Mood normal.         Behavior: Behavior normal.         Pathology:  Collected: 12/03/23 1057   Result status: Final   Resulting lab: [facility]   Value: 1. Appendix, appendectomy:Appendix with acute  appendicitis and periappendicitis.       ASSESSMENT/PLAN:       28-year-old male status post laparoscopic appendectomy with DIANNE drain removed today who is recovering as expected.      - Local wound care to drain site as needed.  -   Advised to call with any signs and symptoms of infection at surgical sites including increased pain, swelling, or redness.  The patient and his wife voiced understanding.  -   Continue light duty for a total of 2 weeks postoperatively.    - Return to clinic 1-2 weeks or sooner if needed.    Juani Maria PA-C  [facility]

## 2023-12-18 ENCOUNTER — OFFICE VISIT (OUTPATIENT)
Dept: SURGERY | Facility: CLINIC | Age: 28
End: 2023-12-18

## 2023-12-18 VITALS
BODY MASS INDEX: 31.12 KG/M2 | SYSTOLIC BLOOD PRESSURE: 105 MMHG | WEIGHT: 235.88 LBS | DIASTOLIC BLOOD PRESSURE: 70 MMHG | HEART RATE: 75 BPM

## 2023-12-18 DIAGNOSIS — K35.30 ACUTE APPENDICITIS WITH LOCALIZED PERITONITIS AND ABSCESS, WITHOUT GANGRENE OR PERFORATION: Primary | ICD-10-CM

## 2023-12-18 PROCEDURE — 99213 OFFICE O/P EST LOW 20 MIN: CPT | Mod: PBBFAC

## 2023-12-18 PROCEDURE — 99999 PR PBB SHADOW E&M-EST. PATIENT-LVL III: CPT | Mod: PBBFAC,,,

## 2023-12-18 PROCEDURE — 99999 PR PBB SHADOW E&M-EST. PATIENT-LVL III: ICD-10-PCS | Mod: PBBFAC,,,

## 2023-12-18 PROCEDURE — 99024 POSTOP FOLLOW-UP VISIT: CPT | Mod: ,,,

## 2023-12-18 PROCEDURE — 99024 PR POST-OP FOLLOW-UP VISIT: ICD-10-PCS | Mod: ,,,

## 2023-12-18 NOTE — PROGRESS NOTES
History & Physical    SUBJECTIVE:     Interpretive services were used to aid in the completion of this visit.    History of Present Illness:  Patient is a 28 y.o. male presents for postoperative evaluation status post laparoscopic appendectomy secondary to acute appendicitis with periappendiceal abscess.  He has a DIANNE drain in place.  He last emptied it yesterday morning, and it currently has less than 5 cc of serous drainage.  He is tolerating a regular diet and having normal bowel movements.  He denies any difficulty urinating, dysuria, and hematuria.  He denies any fevers, chills, nausea, and vomiting.  He denies any abdominal pain.    12/18/2023: Drain site has healed well. Denies any abdominal pain, fevers, chills, nausea, and vomiting. Tolerating a regular diet and having normal bowel movements.     Chief Complaint   Patient presents with    Post-op Evaluation       Review of patient's allergies indicates:  No Known Allergies    No current outpatient medications on file.     No current facility-administered medications for this visit.       History reviewed. No pertinent past medical history.  Past Surgical History:   Procedure Laterality Date    LAPAROSCOPIC APPENDECTOMY N/A 12/3/2023    Procedure: APPENDECTOMY, LAPAROSCOPIC;  Surgeon: Zechariah Cornejo MD;  Location: [facility];  Service: General;  Laterality: N/A;     History reviewed. No pertinent family history.  Social History     Tobacco Use    Smoking status: Never    Smokeless tobacco: Never   Substance Use Topics    Alcohol use: Yes        Review of Systems:  Review of Systems   Constitutional:  Negative for chills, fever and unexpected weight change.   HENT:  Negative for congestion.    Eyes:  Negative for visual disturbance.   Respiratory:  Negative for shortness of breath.    Cardiovascular:  Negative for chest pain.   Gastrointestinal:  Negative for abdominal distention, abdominal pain, constipation, nausea, rectal pain and vomiting.    Genitourinary:  Negative for dysuria.   Musculoskeletal:  Negative for arthralgias.   Skin:  Negative for rash.   Neurological:  Negative for light-headedness.   Hematological:  Negative for adenopathy.       OBJECTIVE:     Vital Signs (Most Recent)  Pulse: 75 (12/18/23 1139)  BP: 105/70 (12/18/23 1139)     107 kg (235 lb 14.3 oz)     Physical Exam:  Physical Exam  Vitals and nursing note reviewed.   Constitutional:       General: He is not in acute distress.     Appearance: He is well-developed. He is not ill-appearing.   HENT:      Head: Normocephalic and atraumatic.      Right Ear: External ear normal.      Left Ear: External ear normal.      Nose: Nose normal.      Mouth/Throat:      Mouth: Mucous membranes are moist.   Eyes:      Extraocular Movements: Extraocular movements intact.   Cardiovascular:      Rate and Rhythm: Normal rate.   Pulmonary:      Effort: Pulmonary effort is normal. No respiratory distress.   Abdominal:      Comments:  Abdomen is soft, non-tender, and non-distended. Previous DIANNE drain site well-healed. Remaining incisions CDI, no SOI, healing well   Musculoskeletal:      Cervical back: Normal range of motion and neck supple.   Skin:     General: Skin is warm and dry.   Neurological:      Mental Status: He is alert and oriented to person, place, and time.   Psychiatric:         Mood and Affect: Mood normal.         Behavior: Behavior normal.         Pathology:  Collected: 12/03/23 1059   Result status: Final   Resulting lab: [facility]   Value: 1. Appendix, appendectomy:Appendix with acute appendicitis and periappendicitis.       ASSESSMENT/PLAN:       28-year-old male status post laparoscopic appendectomy with DIANNE drain removed today who is recovering as expected.      - No further interventions or restrictions  - RTC as needed or if any issues arise    Juani Maria PA-C  [facility]

## 2024-01-24 ENCOUNTER — PATIENT MESSAGE (OUTPATIENT)
Dept: SURGERY | Facility: HOSPITAL | Age: 29
End: 2024-01-24

## 2024-02-05 ENCOUNTER — PATIENT MESSAGE (OUTPATIENT)
Dept: SURGERY | Facility: HOSPITAL | Age: 29
End: 2024-02-05

## (undated) DEVICE — ELECTRODE ENDOPATH + II 5X34

## (undated) DEVICE — ADHESIVE DERMABOND ADVANCED

## (undated) DEVICE — APPLICATOR CHLORAPREP ORN 26ML

## (undated) DEVICE — TROCAR ENDOPATH XCEL 12X100MM

## (undated) DEVICE — TROCAR ENDOPATH XCEL 5X100MM

## (undated) DEVICE — Device

## (undated) DEVICE — SUT 2/0 30IN SILK BLK BRAI

## (undated) DEVICE — GOWN POLY REINF BRTH SLV XL

## (undated) DEVICE — KIT ANTIFOG W/SPONG & FLUID

## (undated) DEVICE — SUT MONOCRYL 4.0 PS2 CP496G

## (undated) DEVICE — HANDLE PISTOL GRIP HAND CNTRL

## (undated) DEVICE — EVACUATOR WOUND BULB 100CC

## (undated) DEVICE — SYR 3CC LUER LOC

## (undated) DEVICE — ELECTRODE REM PLYHSV RETURN 9

## (undated) DEVICE — COVER LIGHT HANDLE 80/CA

## (undated) DEVICE — BAG TISSUE RETRIEVAL 5MM

## (undated) DEVICE — SUT CTD VICRYL 0 UND BR SUT

## (undated) DEVICE — DRAPE LAPSCP CHOLE 122X102X78

## (undated) DEVICE — STAPLER INT LINEAR ARTC 3.5-45

## (undated) DEVICE — CART STAPLE FLEX ETX 3.5MM BLU

## (undated) DEVICE — CART STAPLE RELD 45MM WHT

## (undated) DEVICE — SPONGE COTTON TRAY 4X4IN

## (undated) DEVICE — TOWEL OR DISP STRL BLUE 4/PK

## (undated) DEVICE — TUBING MEDI-VAC 20FT .25IN

## (undated) DEVICE — CORD LAP 10 DISP

## (undated) DEVICE — SET CYSTO IRRIGATION UNIV SPIK

## (undated) DEVICE — PACK BASIC SETUP SC BR

## (undated) DEVICE — MANIFOLD 4 PORT

## (undated) DEVICE — TRAY CATH FOL SIL URIMTR 16FR

## (undated) DEVICE — NDL ECLIPSE SAF REG 25GX1.5IN

## (undated) DEVICE — SYR LUER LOCK STERILE 10ML

## (undated) DEVICE — NDL PNEUMO INSUFFLATI 120MM